# Patient Record
Sex: MALE | Race: WHITE | Employment: OTHER | URBAN - METROPOLITAN AREA
[De-identification: names, ages, dates, MRNs, and addresses within clinical notes are randomized per-mention and may not be internally consistent; named-entity substitution may affect disease eponyms.]

---

## 2017-08-16 ENCOUNTER — HOSPITAL ENCOUNTER (OUTPATIENT)
Dept: PREADMISSION TESTING | Age: 77
Discharge: HOME | End: 2017-08-16
Payer: MEDICARE

## 2017-08-16 VITALS
WEIGHT: 189.15 LBS | TEMPERATURE: 97.4 F | OXYGEN SATURATION: 96 % | SYSTOLIC BLOOD PRESSURE: 186 MMHG | RESPIRATION RATE: 17 BRPM | BODY MASS INDEX: 25.62 KG/M2 | DIASTOLIC BLOOD PRESSURE: 93 MMHG | HEIGHT: 72 IN

## 2017-08-16 LAB
ABO + RH BLD: NORMAL
ALBUMIN SERPL-MCNC: 4.2 G/DL (ref 3.5–5)
ALBUMIN/GLOB SERPL: 1 {RATIO} (ref 1.1–2.2)
ALP SERPL-CCNC: 74 U/L (ref 45–117)
ALT SERPL-CCNC: 20 U/L (ref 12–78)
ANION GAP SERPL CALC-SCNC: 6 MMOL/L (ref 5–15)
APPEARANCE UR: CLEAR
APTT PPP: 26.9 SEC (ref 22.1–32.5)
AST SERPL-CCNC: 24 U/L (ref 15–37)
BACTERIA URNS QL MICRO: NEGATIVE /HPF
BASOPHILS # BLD: 0.1 K/UL (ref 0–0.1)
BASOPHILS NFR BLD: 1 % (ref 0–1)
BILIRUB SERPL-MCNC: 0.6 MG/DL (ref 0.2–1)
BILIRUB UR QL: NEGATIVE
BLOOD GROUP ANTIBODIES SERPL: NORMAL
BUN SERPL-MCNC: 20 MG/DL (ref 6–20)
BUN/CREAT SERPL: 12 (ref 12–20)
CALCIUM SERPL-MCNC: 9.5 MG/DL (ref 8.5–10.1)
CHLORIDE SERPL-SCNC: 101 MMOL/L (ref 97–108)
CO2 SERPL-SCNC: 31 MMOL/L (ref 21–32)
COLOR UR: NORMAL
CREAT SERPL-MCNC: 1.67 MG/DL (ref 0.7–1.3)
EOSINOPHIL # BLD: 0.2 K/UL (ref 0–0.4)
EOSINOPHIL NFR BLD: 2 % (ref 0–7)
EPITH CASTS URNS QL MICRO: NORMAL /LPF
ERYTHROCYTE [DISTWIDTH] IN BLOOD BY AUTOMATED COUNT: 13.5 % (ref 11.5–14.5)
EST. AVERAGE GLUCOSE BLD GHB EST-MCNC: 134 MG/DL
GLOBULIN SER CALC-MCNC: 4.3 G/DL (ref 2–4)
GLUCOSE SERPL-MCNC: 95 MG/DL (ref 65–100)
GLUCOSE UR STRIP.AUTO-MCNC: NEGATIVE MG/DL
HBA1C MFR BLD: 6.3 % (ref 4.2–6.3)
HCT VFR BLD AUTO: 48.5 % (ref 36.6–50.3)
HGB BLD-MCNC: 16.3 G/DL (ref 12.1–17)
HGB UR QL STRIP: NEGATIVE
HYALINE CASTS URNS QL MICRO: NORMAL /LPF (ref 0–5)
INR PPP: 1 (ref 0.9–1.1)
KETONES UR QL STRIP.AUTO: NEGATIVE MG/DL
LEUKOCYTE ESTERASE UR QL STRIP.AUTO: NEGATIVE
LYMPHOCYTES # BLD: 1.5 K/UL (ref 0.8–3.5)
LYMPHOCYTES NFR BLD: 19 % (ref 12–49)
MCH RBC QN AUTO: 31.2 PG (ref 26–34)
MCHC RBC AUTO-ENTMCNC: 33.6 G/DL (ref 30–36.5)
MCV RBC AUTO: 92.7 FL (ref 80–99)
MONOCYTES # BLD: 1 K/UL (ref 0–1)
MONOCYTES NFR BLD: 12 % (ref 5–13)
NEUTS SEG # BLD: 5.4 K/UL (ref 1.8–8)
NEUTS SEG NFR BLD: 66 % (ref 32–75)
NITRITE UR QL STRIP.AUTO: NEGATIVE
PH UR STRIP: 5.5 [PH] (ref 5–8)
PLATELET # BLD AUTO: 243 K/UL (ref 150–400)
POTASSIUM SERPL-SCNC: 4.8 MMOL/L (ref 3.5–5.1)
PROT SERPL-MCNC: 8.5 G/DL (ref 6.4–8.2)
PROT UR STRIP-MCNC: NEGATIVE MG/DL
PROTHROMBIN TIME: 10.1 SEC (ref 9–11.1)
RBC # BLD AUTO: 5.23 M/UL (ref 4.1–5.7)
RBC #/AREA URNS HPF: NORMAL /HPF (ref 0–5)
SODIUM SERPL-SCNC: 138 MMOL/L (ref 136–145)
SP GR UR REFRACTOMETRY: 1.01 (ref 1–1.03)
SPECIMEN EXP DATE BLD: NORMAL
THERAPEUTIC RANGE,PTTT: NORMAL SECS (ref 58–77)
UA: UC IF INDICATED,UAUC: NORMAL
UROBILINOGEN UR QL STRIP.AUTO: 0.2 EU/DL (ref 0.2–1)
WBC # BLD AUTO: 8.1 K/UL (ref 4.1–11.1)
WBC URNS QL MICRO: NORMAL /HPF (ref 0–4)

## 2017-08-16 PROCEDURE — 85730 THROMBOPLASTIN TIME PARTIAL: CPT | Performed by: ORTHOPAEDIC SURGERY

## 2017-08-16 PROCEDURE — 36415 COLL VENOUS BLD VENIPUNCTURE: CPT | Performed by: ORTHOPAEDIC SURGERY

## 2017-08-16 PROCEDURE — 86900 BLOOD TYPING SEROLOGIC ABO: CPT | Performed by: ORTHOPAEDIC SURGERY

## 2017-08-16 PROCEDURE — 93005 ELECTROCARDIOGRAM TRACING: CPT

## 2017-08-16 PROCEDURE — 80053 COMPREHEN METABOLIC PANEL: CPT | Performed by: ORTHOPAEDIC SURGERY

## 2017-08-16 PROCEDURE — 81001 URINALYSIS AUTO W/SCOPE: CPT | Performed by: ORTHOPAEDIC SURGERY

## 2017-08-16 PROCEDURE — 85025 COMPLETE CBC W/AUTO DIFF WBC: CPT | Performed by: ORTHOPAEDIC SURGERY

## 2017-08-16 PROCEDURE — 85610 PROTHROMBIN TIME: CPT | Performed by: ORTHOPAEDIC SURGERY

## 2017-08-16 PROCEDURE — 83036 HEMOGLOBIN GLYCOSYLATED A1C: CPT | Performed by: ORTHOPAEDIC SURGERY

## 2017-08-16 RX ORDER — CHOLECALCIFEROL (VITAMIN D3) 125 MCG
1 CAPSULE ORAL
COMMUNITY
End: 2017-08-24

## 2017-08-16 RX ORDER — ASCORBIC ACID 500 MG
500 TABLET ORAL DAILY
COMMUNITY

## 2017-08-16 RX ORDER — BISOPROLOL FUMARATE AND HYDROCHLOROTHIAZIDE 10; 6.25 MG/1; MG/1
1 TABLET ORAL DAILY
COMMUNITY

## 2017-08-16 RX ORDER — VITAMIN E 268 MG
CAPSULE ORAL DAILY
COMMUNITY
End: 2017-08-24

## 2017-08-16 RX ORDER — FAMOTIDINE 20 MG/1
20 TABLET, FILM COATED ORAL 2 TIMES DAILY
COMMUNITY

## 2017-08-16 RX ORDER — FAMOTIDINE 20 MG/1
20 TABLET, FILM COATED ORAL 2 TIMES DAILY
COMMUNITY
End: 2017-08-16

## 2017-08-16 RX ORDER — BENAZEPRIL HYDROCHLORIDE 40 MG/1
40 TABLET ORAL DAILY
COMMUNITY

## 2017-08-16 RX ORDER — CILOSTAZOL 100 MG/1
100 TABLET ORAL 2 TIMES DAILY
COMMUNITY
End: 2017-08-24

## 2017-08-16 NOTE — PERIOP NOTES
Called pt's PCP Dr Paz Ours requested last chemiatry results to have comparison for today's creatinine of 1.67   Office to fax   States pt creatinine was 1.6 in May 2017

## 2017-08-16 NOTE — PERIOP NOTES
[facility]  PREOPERATIVE INSTRUCTIONS    Surgery Date:   8/21/2017  Surgery arrival time given by surgeon: NO   If [facility] staff will call you between 4 PM- 8 PM the day before surgery with your arrival time. If your surgery is on a Monday, we will call you the preceding Friday. Please call [phone] after 8 PM if you did not receive your arrival time. 1. Please report at the designated time to the [address] [place] [address] [facility]. Bring your insurance card, photo identification, and any copayment ( if applicable). 2. You must have a responsible adult to drive you home. You need to have a responsible adult to stay with you the first 24 hours after surgery if you are going home the same day of your surgery and you should not drive a car for 24 hours following your surgery. 3. Nothing to eat or drink after midnight the night before surgery. This includes no water, gum, mints, coffee, juice, etc.  Please note special instructions, if applicable, below for medications. 4. MEDICATIONS TO TAKE THE MORNING OF SURGERY WITH A SIP OF WATER: ______famotidine, bisoprolol/hydochlorothiazide________        Your prescription pain medicine may be taken with a sip of water the morning of surgery  5. No alcoholic beverages 24 hours before or after your surgery. 6. If you are being admitted to the hospital, please leave personal belongings/luggage in your car until you have an assigned hospital room number. 7. Stop Aspirin and/or any non-steroidal anti-inflammatory drugs (i.e. Ibuprofen, Naproxen, Advil, Aleve) as directed by your surgeon. You may take Tylenol. 8. Stop herbal supplements 1 week prior to surgery. 9. If you are currently taking Plavix, Coumadin,or any other blood-thinning/anticoagulant medication contact your surgeon for instructions. 10. Please wear comfortable clothes. Wear your glasses instead of contacts. We ask that all money, jewelry and valuables be left at home.  Wear no make-up, particularly mascara, the day of surgery. 11.  All body piercings, rings and jewelry need to be removed and left at home. Please wear your hair loose or down. Please no pony-tails, buns, or any metal hair accessories. If you shower the morning of surgery, please do not apply any lotions, powders, or deodorants afterwards. Do not shave any body area within 24 hours of your surgery. 12. Please follow all instructions to avoid any potential surgical cancellation. 13. Should your physical condition change, (i.e. fever, cold, flu, etc.) please notify your surgeon as soon as possible. 14. It is important to be on time. If a situation occurs where you may be delayed, please call:  [phone] / [phone] on the day of surgery. 15. The Preadmission Testing staff can be reached at 21 [phone]. Meir Arnold 12. Bring your completed Medication Reconciliation sheet with your the morning of surgery  Special instructions:   · Free  Parking between [address]  The patient was contacted  in person. He  verbalizes  understanding of all instructions   Medications reviewed and med reconciliation sheets for prescriptions given to patient for review & return day of surgery. Special Instructions:  · Use Chlorhexidine Care Fusion wash and sponges 3 days prior to surgery as instructed. · Incentive spirometer given with instructions to practice at home and bring back to the hospital on the day of surgery. · [facility] will contact you if your Hemoglobin A1C is greater than 7.5. · Ensure/Glucerna  sample, nutritional information, and Ensure/Glucerna coupon given. · Pain pamphlet and Call Don't Fall reminder reviewed with patient.   ·  parking is complimentary Monday - Friday 7 am - 5 pm  · Bring PTA Medication list day of surgery with the last doses taken documented

## 2017-08-16 NOTE — PERIOP NOTES
Faxed CMP results of today c creatinine of 1.67 to Dr Alondra Cerrato office along with past labs obtained from pt's PCP office that show elevated creatinine for past year

## 2017-08-16 NOTE — PERIOP NOTES
fax form for medication plan cilostazol to Dr Laura Wang (prescriber of cilostazol) for recommendations.     Pt stataed he stopped this med on 8/11/2017

## 2017-08-16 NOTE — PERIOP NOTES
Per Pt:Dr Sue Booth told him to stop his cilostazol, fish oil, aleve and all vitamin supplements 10 days before surgery    Pt also states he had his physical done today c Dr Hank Russell

## 2017-08-17 LAB
ATRIAL RATE: 68 BPM
BACTERIA SPEC CULT: NORMAL
BACTERIA SPEC CULT: NORMAL
CALCULATED P AXIS, ECG09: -23 DEGREES
CALCULATED R AXIS, ECG10: -22 DEGREES
CALCULATED T AXIS, ECG11: 2 DEGREES
DIAGNOSIS, 93000: NORMAL
P-R INTERVAL, ECG05: 166 MS
Q-T INTERVAL, ECG07: 402 MS
QRS DURATION, ECG06: 102 MS
QTC CALCULATION (BEZET), ECG08: 427 MS
SERVICE CMNT-IMP: NORMAL
VENTRICULAR RATE, ECG03: 68 BPM

## 2017-08-18 ENCOUNTER — ANESTHESIA EVENT (OUTPATIENT)
Dept: SURGERY | Age: 77
DRG: 460 | End: 2017-08-18
Payer: MEDICARE

## 2017-08-21 ENCOUNTER — ANESTHESIA (OUTPATIENT)
Dept: SURGERY | Age: 77
DRG: 460 | End: 2017-08-21
Payer: MEDICARE

## 2017-08-21 ENCOUNTER — HOSPITAL ENCOUNTER (INPATIENT)
Age: 77
LOS: 3 days | Discharge: HOME HEALTH | DRG: 460 | End: 2017-08-24
Attending: ORTHOPAEDIC SURGERY | Admitting: ORTHOPAEDIC SURGERY
Payer: MEDICARE

## 2017-08-21 ENCOUNTER — APPOINTMENT (OUTPATIENT)
Dept: GENERAL RADIOLOGY | Age: 77
DRG: 460 | End: 2017-08-21
Attending: ORTHOPAEDIC SURGERY
Payer: MEDICARE

## 2017-08-21 PROBLEM — N28.9 RENAL INSUFFICIENCY: Status: ACTIVE | Noted: 2017-08-21

## 2017-08-21 PROBLEM — I82.409 DVT (DEEP VENOUS THROMBOSIS) (HCC): Status: ACTIVE | Noted: 2017-08-21

## 2017-08-21 PROBLEM — M48.062 LUMBAR STENOSIS WITH NEUROGENIC CLAUDICATION: Status: ACTIVE | Noted: 2017-08-21

## 2017-08-21 PROBLEM — I10 HTN (HYPERTENSION): Status: ACTIVE | Noted: 2017-08-21

## 2017-08-21 PROCEDURE — 65270000029 HC RM PRIVATE

## 2017-08-21 PROCEDURE — 77030034274 HC GRFT BN CHIP ALLGRFT 10CC REGE -I: Performed by: ORTHOPAEDIC SURGERY

## 2017-08-21 PROCEDURE — 74011250636 HC RX REV CODE- 250/636: Performed by: ORTHOPAEDIC SURGERY

## 2017-08-21 PROCEDURE — 93971 EXTREMITY STUDY: CPT

## 2017-08-21 PROCEDURE — 77030029099 HC BN WAX SSPC -A: Performed by: ORTHOPAEDIC SURGERY

## 2017-08-21 PROCEDURE — 77030012406 HC DRN WND PENRS BARD -A: Performed by: ORTHOPAEDIC SURGERY

## 2017-08-21 PROCEDURE — 77030008684 HC TU ET CUF COVD -B: Performed by: NURSE ANESTHETIST, CERTIFIED REGISTERED

## 2017-08-21 PROCEDURE — 77030020782 HC GWN BAIR PAWS FLX 3M -B

## 2017-08-21 PROCEDURE — 77030003666 HC NDL SPINAL BD -A: Performed by: ORTHOPAEDIC SURGERY

## 2017-08-21 PROCEDURE — C1713 ANCHOR/SCREW BN/BN,TIS/BN: HCPCS | Performed by: ORTHOPAEDIC SURGERY

## 2017-08-21 PROCEDURE — 77030026188 HC BN CANC CHP CRSH PR LIFV -E: Performed by: ORTHOPAEDIC SURGERY

## 2017-08-21 PROCEDURE — 74011250636 HC RX REV CODE- 250/636: Performed by: ANESTHESIOLOGY

## 2017-08-21 PROCEDURE — 77030004391 HC BUR FLUT MEDT -C: Performed by: ORTHOPAEDIC SURGERY

## 2017-08-21 PROCEDURE — 76001 XR FLUOROSCOPY OVER 60 MINUTES: CPT

## 2017-08-21 PROCEDURE — 74011250636 HC RX REV CODE- 250/636

## 2017-08-21 PROCEDURE — 77030033067 HC SUT PDO STRATFX SPIR J&J -B: Performed by: ORTHOPAEDIC SURGERY

## 2017-08-21 PROCEDURE — 76010000175 HC OR TIME 4 TO 4.5 HR INTENSV-TIER 1: Performed by: ORTHOPAEDIC SURGERY

## 2017-08-21 PROCEDURE — 77030034850: Performed by: ORTHOPAEDIC SURGERY

## 2017-08-21 PROCEDURE — 77030037202 HC SPCR SPN BULL TIP PEK VBR IBF REGE -I1: Performed by: ORTHOPAEDIC SURGERY

## 2017-08-21 PROCEDURE — 77030034479 HC ADH SKN CLSR PRINEO J&J -B: Performed by: ORTHOPAEDIC SURGERY

## 2017-08-21 PROCEDURE — 74011000272 HC RX REV CODE- 272: Performed by: ORTHOPAEDIC SURGERY

## 2017-08-21 PROCEDURE — 77030018723 HC ELCTRD BLD COVD -A: Performed by: ORTHOPAEDIC SURGERY

## 2017-08-21 PROCEDURE — 77030003161 HC GRFT DURA MTRX INLC -E: Performed by: ORTHOPAEDIC SURGERY

## 2017-08-21 PROCEDURE — 74011000250 HC RX REV CODE- 250

## 2017-08-21 PROCEDURE — 76060000039 HC ANESTHESIA 4 TO 4.5 HR: Performed by: ORTHOPAEDIC SURGERY

## 2017-08-21 PROCEDURE — 74011250637 HC RX REV CODE- 250/637: Performed by: ORTHOPAEDIC SURGERY

## 2017-08-21 PROCEDURE — 74011250636 HC RX REV CODE- 250/636: Performed by: PHYSICIAN ASSISTANT

## 2017-08-21 PROCEDURE — 77030019908 HC STETH ESOPH SIMS -A: Performed by: NURSE ANESTHETIST, CERTIFIED REGISTERED

## 2017-08-21 PROCEDURE — 77030012890

## 2017-08-21 PROCEDURE — 74011000250 HC RX REV CODE- 250: Performed by: ORTHOPAEDIC SURGERY

## 2017-08-21 PROCEDURE — 77030013079 HC BLNKT BAIR HGGR 3M -A: Performed by: ANESTHESIOLOGY

## 2017-08-21 PROCEDURE — 77030026438 HC STYL ET INTUB CARD -A: Performed by: NURSE ANESTHETIST, CERTIFIED REGISTERED

## 2017-08-21 PROCEDURE — 77030018846 HC SOL IRR STRL H20 ICUM -A: Performed by: ORTHOPAEDIC SURGERY

## 2017-08-21 PROCEDURE — 77030018719 HC DRSG PTCH ANTIMIC J&J -A: Performed by: ORTHOPAEDIC SURGERY

## 2017-08-21 PROCEDURE — 77030030107 HC BLD BN MILL DISP STRY -C: Performed by: ORTHOPAEDIC SURGERY

## 2017-08-21 PROCEDURE — 77030031139 HC SUT VCRL2 J&J -A: Performed by: ORTHOPAEDIC SURGERY

## 2017-08-21 PROCEDURE — 0SG10A1 FUSION 2-4 L JT W INTBD FUS DEV, POST APPR P COL, OPEN: ICD-10-PCS | Performed by: ORTHOPAEDIC SURGERY

## 2017-08-21 PROCEDURE — 4A11X4G MONITORING OF PERIPHERAL NERVOUS ELECTRICAL ACTIVITY, INTRAOPERATIVE, EXTERNAL APPROACH: ICD-10-PCS | Performed by: ORTHOPAEDIC SURGERY

## 2017-08-21 PROCEDURE — 77030032490 HC SLV COMPR SCD KNE COVD -B: Performed by: ORTHOPAEDIC SURGERY

## 2017-08-21 PROCEDURE — 77030002933 HC SUT MCRYL J&J -A: Performed by: ORTHOPAEDIC SURGERY

## 2017-08-21 PROCEDURE — 76210000001 HC OR PH I REC 2.5 TO 3 HR: Performed by: ORTHOPAEDIC SURGERY

## 2017-08-21 DEVICE — SCR SET SPNE STREAMLINE TL --: Type: IMPLANTABLE DEVICE | Site: BACK | Status: FUNCTIONAL

## 2017-08-21 DEVICE — SPACER SPNL BULL 22X11X13 MM INTBDY THORLUM PEEK: Type: IMPLANTABLE DEVICE | Site: SPINE LUMBAR | Status: FUNCTIONAL

## 2017-08-21 DEVICE — SCR BNE SPNE 6.5X50MM TI -- STREAMLINE TL: Type: IMPLANTABLE DEVICE | Site: BACK | Status: FUNCTIONAL

## 2017-08-21 DEVICE — DURAGEN® SUTURABLE DURAL REGENERATION MATRIX, 2 IN X 2 IN (5 CM X 5 CM)
Type: IMPLANTABLE DEVICE | Site: SPINE LUMBAR | Status: FUNCTIONAL
Brand: DURAGEN® SUTURABLE

## 2017-08-21 DEVICE — GRAFT BNE 30CC 1 4MM CANC CRUSH CHIP READIGRFT: Type: IMPLANTABLE DEVICE | Site: SPINE LUMBAR | Status: FUNCTIONAL

## 2017-08-21 DEVICE — SCR BNE SPNE 6.5X45MM TI -- STREAMLINE TL: Type: IMPLANTABLE DEVICE | Site: BACK | Status: FUNCTIONAL

## 2017-08-21 DEVICE — GRAFT BNE SUB 10CC CORT CANC DBM CRYOGENICALLY PRESERVED: Type: IMPLANTABLE DEVICE | Site: SPINE LUMBAR | Status: FUNCTIONAL

## 2017-08-21 DEVICE — 5.5MM CURVED ROD 65MM TI ALLOY
Type: IMPLANTABLE DEVICE | Site: BACK | Status: FUNCTIONAL
Brand: TAURUS

## 2017-08-21 RX ORDER — ONDANSETRON 2 MG/ML
INJECTION INTRAMUSCULAR; INTRAVENOUS AS NEEDED
Status: DISCONTINUED | OUTPATIENT
Start: 2017-08-21 | End: 2017-08-21 | Stop reason: HOSPADM

## 2017-08-21 RX ORDER — FAMOTIDINE 20 MG/1
20 TABLET, FILM COATED ORAL 2 TIMES DAILY
Status: DISCONTINUED | OUTPATIENT
Start: 2017-08-21 | End: 2017-08-24 | Stop reason: HOSPADM

## 2017-08-21 RX ORDER — SODIUM CHLORIDE 9 MG/ML
50 INJECTION, SOLUTION INTRAVENOUS CONTINUOUS
Status: DISCONTINUED | OUTPATIENT
Start: 2017-08-21 | End: 2017-08-24 | Stop reason: HOSPADM

## 2017-08-21 RX ORDER — POVIDONE-IODINE 10 %
SOLUTION, NON-ORAL TOPICAL AS NEEDED
Status: DISCONTINUED | OUTPATIENT
Start: 2017-08-21 | End: 2017-08-21 | Stop reason: HOSPADM

## 2017-08-21 RX ORDER — HYDROMORPHONE HCL IN 0.9% NACL 15 MG/30ML
PATIENT CONTROLLED ANALGESIA VIAL INTRAVENOUS
Status: DISCONTINUED | OUTPATIENT
Start: 2017-08-21 | End: 2017-08-22

## 2017-08-21 RX ORDER — SODIUM CHLORIDE, SODIUM LACTATE, POTASSIUM CHLORIDE, CALCIUM CHLORIDE 600; 310; 30; 20 MG/100ML; MG/100ML; MG/100ML; MG/100ML
125 INJECTION, SOLUTION INTRAVENOUS CONTINUOUS
Status: DISCONTINUED | OUTPATIENT
Start: 2017-08-21 | End: 2017-08-21 | Stop reason: HOSPADM

## 2017-08-21 RX ORDER — DEXAMETHASONE SODIUM PHOSPHATE 4 MG/ML
INJECTION, SOLUTION INTRA-ARTICULAR; INTRALESIONAL; INTRAMUSCULAR; INTRAVENOUS; SOFT TISSUE AS NEEDED
Status: DISCONTINUED | OUTPATIENT
Start: 2017-08-21 | End: 2017-08-21 | Stop reason: HOSPADM

## 2017-08-21 RX ORDER — CEFAZOLIN SODIUM IN 0.9 % NACL 2 G/50 ML
2 INTRAVENOUS SOLUTION, PIGGYBACK (ML) INTRAVENOUS EVERY 8 HOURS
Status: COMPLETED | OUTPATIENT
Start: 2017-08-21 | End: 2017-08-23

## 2017-08-21 RX ORDER — ACETAMINOPHEN 325 MG/1
650 TABLET ORAL
Status: DISCONTINUED | OUTPATIENT
Start: 2017-08-21 | End: 2017-08-24 | Stop reason: HOSPADM

## 2017-08-21 RX ORDER — BISOPROLOL FUMARATE AND HYDROCHLOROTHIAZIDE 10; 6.25 MG/1; MG/1
1 TABLET ORAL DAILY
Status: DISCONTINUED | OUTPATIENT
Start: 2017-08-22 | End: 2017-08-24 | Stop reason: HOSPADM

## 2017-08-21 RX ORDER — HYDRALAZINE HYDROCHLORIDE 20 MG/ML
10 INJECTION INTRAMUSCULAR; INTRAVENOUS
Status: DISCONTINUED | OUTPATIENT
Start: 2017-08-21 | End: 2017-08-24 | Stop reason: HOSPADM

## 2017-08-21 RX ORDER — ONDANSETRON 2 MG/ML
4 INJECTION INTRAMUSCULAR; INTRAVENOUS AS NEEDED
Status: DISCONTINUED | OUTPATIENT
Start: 2017-08-21 | End: 2017-08-21 | Stop reason: HOSPADM

## 2017-08-21 RX ORDER — NALOXONE HYDROCHLORIDE 0.4 MG/ML
0.4 INJECTION, SOLUTION INTRAMUSCULAR; INTRAVENOUS; SUBCUTANEOUS AS NEEDED
Status: DISCONTINUED | OUTPATIENT
Start: 2017-08-21 | End: 2017-08-24 | Stop reason: HOSPADM

## 2017-08-21 RX ORDER — DOCUSATE SODIUM 100 MG/1
100 CAPSULE, LIQUID FILLED ORAL 2 TIMES DAILY
Status: DISCONTINUED | OUTPATIENT
Start: 2017-08-22 | End: 2017-08-24 | Stop reason: HOSPADM

## 2017-08-21 RX ORDER — SODIUM CHLORIDE, SODIUM LACTATE, POTASSIUM CHLORIDE, CALCIUM CHLORIDE 600; 310; 30; 20 MG/100ML; MG/100ML; MG/100ML; MG/100ML
100 INJECTION, SOLUTION INTRAVENOUS CONTINUOUS
Status: DISCONTINUED | OUTPATIENT
Start: 2017-08-21 | End: 2017-08-21 | Stop reason: HOSPADM

## 2017-08-21 RX ORDER — FENTANYL CITRATE 50 UG/ML
INJECTION, SOLUTION INTRAMUSCULAR; INTRAVENOUS AS NEEDED
Status: DISCONTINUED | OUTPATIENT
Start: 2017-08-21 | End: 2017-08-21 | Stop reason: HOSPADM

## 2017-08-21 RX ORDER — HYDROMORPHONE HYDROCHLORIDE 2 MG/ML
INJECTION, SOLUTION INTRAMUSCULAR; INTRAVENOUS; SUBCUTANEOUS AS NEEDED
Status: DISCONTINUED | OUTPATIENT
Start: 2017-08-21 | End: 2017-08-21 | Stop reason: HOSPADM

## 2017-08-21 RX ORDER — OXYCODONE AND ACETAMINOPHEN 10; 325 MG/1; MG/1
1 TABLET ORAL
Status: DISCONTINUED | OUTPATIENT
Start: 2017-08-21 | End: 2017-08-22

## 2017-08-21 RX ORDER — SODIUM CHLORIDE 0.9 % (FLUSH) 0.9 %
5-10 SYRINGE (ML) INJECTION AS NEEDED
Status: DISCONTINUED | OUTPATIENT
Start: 2017-08-21 | End: 2017-08-21 | Stop reason: HOSPADM

## 2017-08-21 RX ORDER — HYDROMORPHONE HYDROCHLORIDE 1 MG/ML
.25-1 INJECTION, SOLUTION INTRAMUSCULAR; INTRAVENOUS; SUBCUTANEOUS
Status: DISCONTINUED | OUTPATIENT
Start: 2017-08-21 | End: 2017-08-21 | Stop reason: HOSPADM

## 2017-08-21 RX ORDER — SODIUM CHLORIDE 0.9 % (FLUSH) 0.9 %
5-10 SYRINGE (ML) INJECTION AS NEEDED
Status: DISCONTINUED | OUTPATIENT
Start: 2017-08-21 | End: 2017-08-24 | Stop reason: HOSPADM

## 2017-08-21 RX ORDER — LIDOCAINE HYDROCHLORIDE 20 MG/ML
INJECTION, SOLUTION EPIDURAL; INFILTRATION; INTRACAUDAL; PERINEURAL AS NEEDED
Status: DISCONTINUED | OUTPATIENT
Start: 2017-08-21 | End: 2017-08-21 | Stop reason: HOSPADM

## 2017-08-21 RX ORDER — SODIUM CHLORIDE 0.9 % (FLUSH) 0.9 %
5-10 SYRINGE (ML) INJECTION EVERY 8 HOURS
Status: DISCONTINUED | OUTPATIENT
Start: 2017-08-21 | End: 2017-08-24 | Stop reason: HOSPADM

## 2017-08-21 RX ORDER — PROPOFOL 10 MG/ML
INJECTION, EMULSION INTRAVENOUS AS NEEDED
Status: DISCONTINUED | OUTPATIENT
Start: 2017-08-21 | End: 2017-08-21 | Stop reason: HOSPADM

## 2017-08-21 RX ORDER — BENAZEPRIL HYDROCHLORIDE 10 MG/1
40 TABLET ORAL DAILY
Status: DISCONTINUED | OUTPATIENT
Start: 2017-08-21 | End: 2017-08-24 | Stop reason: HOSPADM

## 2017-08-21 RX ORDER — SODIUM CHLORIDE 0.9 % (FLUSH) 0.9 %
5-10 SYRINGE (ML) INJECTION EVERY 8 HOURS
Status: DISCONTINUED | OUTPATIENT
Start: 2017-08-21 | End: 2017-08-21 | Stop reason: HOSPADM

## 2017-08-21 RX ORDER — DEXAMETHASONE SODIUM PHOSPHATE 10 MG/ML
6 INJECTION INTRAMUSCULAR; INTRAVENOUS
Status: COMPLETED | OUTPATIENT
Start: 2017-08-21 | End: 2017-08-21

## 2017-08-21 RX ORDER — DIPHENHYDRAMINE HYDROCHLORIDE 50 MG/ML
12.5 INJECTION, SOLUTION INTRAMUSCULAR; INTRAVENOUS AS NEEDED
Status: DISCONTINUED | OUTPATIENT
Start: 2017-08-21 | End: 2017-08-21 | Stop reason: HOSPADM

## 2017-08-21 RX ORDER — SUCCINYLCHOLINE CHLORIDE 20 MG/ML
INJECTION INTRAMUSCULAR; INTRAVENOUS AS NEEDED
Status: DISCONTINUED | OUTPATIENT
Start: 2017-08-21 | End: 2017-08-21 | Stop reason: HOSPADM

## 2017-08-21 RX ORDER — ROCURONIUM BROMIDE 10 MG/ML
INJECTION, SOLUTION INTRAVENOUS AS NEEDED
Status: DISCONTINUED | OUTPATIENT
Start: 2017-08-21 | End: 2017-08-21 | Stop reason: HOSPADM

## 2017-08-21 RX ORDER — DIAZEPAM 5 MG/1
5 TABLET ORAL
Status: DISCONTINUED | OUTPATIENT
Start: 2017-08-21 | End: 2017-08-24 | Stop reason: HOSPADM

## 2017-08-21 RX ORDER — DIPHENHYDRAMINE HCL 25 MG
25 CAPSULE ORAL
Status: DISCONTINUED | OUTPATIENT
Start: 2017-08-21 | End: 2017-08-24 | Stop reason: HOSPADM

## 2017-08-21 RX ORDER — LIDOCAINE HYDROCHLORIDE 10 MG/ML
0.1 INJECTION, SOLUTION EPIDURAL; INFILTRATION; INTRACAUDAL; PERINEURAL AS NEEDED
Status: DISCONTINUED | OUTPATIENT
Start: 2017-08-21 | End: 2017-08-21 | Stop reason: HOSPADM

## 2017-08-21 RX ORDER — MIDAZOLAM HYDROCHLORIDE 1 MG/ML
INJECTION, SOLUTION INTRAMUSCULAR; INTRAVENOUS AS NEEDED
Status: DISCONTINUED | OUTPATIENT
Start: 2017-08-21 | End: 2017-08-21 | Stop reason: HOSPADM

## 2017-08-21 RX ORDER — HYDROMORPHONE HYDROCHLORIDE 1 MG/ML
1 INJECTION, SOLUTION INTRAMUSCULAR; INTRAVENOUS; SUBCUTANEOUS
Status: DISCONTINUED | OUTPATIENT
Start: 2017-08-21 | End: 2017-08-22

## 2017-08-21 RX ORDER — MELATONIN
1000 DAILY
Status: DISCONTINUED | OUTPATIENT
Start: 2017-08-22 | End: 2017-08-24 | Stop reason: HOSPADM

## 2017-08-21 RX ORDER — ONDANSETRON 2 MG/ML
4 INJECTION INTRAMUSCULAR; INTRAVENOUS
Status: DISCONTINUED | OUTPATIENT
Start: 2017-08-21 | End: 2017-08-24 | Stop reason: HOSPADM

## 2017-08-21 RX ADMIN — PROPOFOL 30 MG: 10 INJECTION, EMULSION INTRAVENOUS at 14:18

## 2017-08-21 RX ADMIN — ROCURONIUM BROMIDE 20 MG: 10 INJECTION, SOLUTION INTRAVENOUS at 15:02

## 2017-08-21 RX ADMIN — FENTANYL CITRATE 75 MCG: 50 INJECTION, SOLUTION INTRAMUSCULAR; INTRAVENOUS at 14:15

## 2017-08-21 RX ADMIN — ONDANSETRON 4 MG: 2 INJECTION INTRAMUSCULAR; INTRAVENOUS at 17:17

## 2017-08-21 RX ADMIN — BENAZEPRIL HYDROCHLORIDE 40 MG: 10 TABLET, COATED ORAL at 22:53

## 2017-08-21 RX ADMIN — LIDOCAINE HYDROCHLORIDE 60 MG: 20 INJECTION, SOLUTION EPIDURAL; INFILTRATION; INTRACAUDAL; PERINEURAL at 14:15

## 2017-08-21 RX ADMIN — CEFAZOLIN 0.2 G: 1 INJECTION, POWDER, FOR SOLUTION INTRAMUSCULAR; INTRAVENOUS; PARENTERAL at 13:38

## 2017-08-21 RX ADMIN — HYDROMORPHONE HYDROCHLORIDE 1 MG: 1 INJECTION, SOLUTION INTRAMUSCULAR; INTRAVENOUS; SUBCUTANEOUS at 20:12

## 2017-08-21 RX ADMIN — FENTANYL CITRATE 50 MCG: 50 INJECTION, SOLUTION INTRAMUSCULAR; INTRAVENOUS at 14:18

## 2017-08-21 RX ADMIN — DEXAMETHASONE SODIUM PHOSPHATE 4 MG: 4 INJECTION, SOLUTION INTRA-ARTICULAR; INTRALESIONAL; INTRAMUSCULAR; INTRAVENOUS; SOFT TISSUE at 14:44

## 2017-08-21 RX ADMIN — HYDROMORPHONE HYDROCHLORIDE 0.5 MG: 2 INJECTION, SOLUTION INTRAMUSCULAR; INTRAVENOUS; SUBCUTANEOUS at 17:22

## 2017-08-21 RX ADMIN — ROCURONIUM BROMIDE 5 MG: 10 INJECTION, SOLUTION INTRAVENOUS at 14:15

## 2017-08-21 RX ADMIN — PROPOFOL 130 MG: 10 INJECTION, EMULSION INTRAVENOUS at 14:15

## 2017-08-21 RX ADMIN — FAMOTIDINE 20 MG: 20 TABLET, FILM COATED ORAL at 22:53

## 2017-08-21 RX ADMIN — CEFAZOLIN 2 G: 1 INJECTION, POWDER, FOR SOLUTION INTRAMUSCULAR; INTRAVENOUS; PARENTERAL at 22:53

## 2017-08-21 RX ADMIN — SODIUM CHLORIDE, SODIUM LACTATE, POTASSIUM CHLORIDE, AND CALCIUM CHLORIDE: 600; 310; 30; 20 INJECTION, SOLUTION INTRAVENOUS at 18:10

## 2017-08-21 RX ADMIN — FENTANYL CITRATE 50 MCG: 50 INJECTION, SOLUTION INTRAMUSCULAR; INTRAVENOUS at 15:50

## 2017-08-21 RX ADMIN — DEXAMETHASONE SODIUM PHOSPHATE 6 MG: 10 INJECTION, SOLUTION INTRAMUSCULAR; INTRAVENOUS at 22:54

## 2017-08-21 RX ADMIN — HYDROMORPHONE HYDROCHLORIDE 1 MG: 1 INJECTION, SOLUTION INTRAMUSCULAR; INTRAVENOUS; SUBCUTANEOUS at 18:58

## 2017-08-21 RX ADMIN — SODIUM CHLORIDE, SODIUM LACTATE, POTASSIUM CHLORIDE, AND CALCIUM CHLORIDE 100 ML/HR: 600; 310; 30; 20 INJECTION, SOLUTION INTRAVENOUS at 13:20

## 2017-08-21 RX ADMIN — HYDROMORPHONE HYDROCHLORIDE 0.5 MG: 2 INJECTION, SOLUTION INTRAMUSCULAR; INTRAVENOUS; SUBCUTANEOUS at 17:19

## 2017-08-21 RX ADMIN — Medication 10 ML: at 22:54

## 2017-08-21 RX ADMIN — MIDAZOLAM HYDROCHLORIDE 2 MG: 1 INJECTION, SOLUTION INTRAMUSCULAR; INTRAVENOUS at 14:06

## 2017-08-21 RX ADMIN — FENTANYL CITRATE 25 MCG: 50 INJECTION, SOLUTION INTRAMUSCULAR; INTRAVENOUS at 14:06

## 2017-08-21 RX ADMIN — SODIUM CHLORIDE 125 ML/HR: 900 INJECTION, SOLUTION INTRAVENOUS at 19:15

## 2017-08-21 RX ADMIN — SODIUM CHLORIDE, SODIUM LACTATE, POTASSIUM CHLORIDE, AND CALCIUM CHLORIDE: 600; 310; 30; 20 INJECTION, SOLUTION INTRAVENOUS at 14:45

## 2017-08-21 RX ADMIN — FENTANYL CITRATE 50 MCG: 50 INJECTION, SOLUTION INTRAMUSCULAR; INTRAVENOUS at 16:37

## 2017-08-21 RX ADMIN — Medication: at 18:58

## 2017-08-21 RX ADMIN — SUCCINYLCHOLINE CHLORIDE 100 MG: 20 INJECTION INTRAMUSCULAR; INTRAVENOUS at 14:15

## 2017-08-21 NOTE — IP AVS SNAPSHOT
[address] 
[phone] Patient: Candace Portillo MRN: [id number] :1940 You are allergic to the following Allergen Reactions Peanut Angioedema Penicillins Hives Bad hives after penicillin injection, unsure if he has ever had a cephalosporin Recent Documentation Height Weight BMI Smoking Status 1.829 m 84.5 kg 25.27 kg/m2 Former Smoker Emergency Contacts Name Discharge Info Relation Home Work Mobile [facility] 144 CAREGIVER [3] Spouse [3] [phone] About your hospitalization You were admitted on:  2017 You last received care in the:  [facility] 4M POST SURG ORT 1 You were discharged on:  2017 Unit phone number:  [phone] Why you were hospitalized Your primary diagnosis was:  Not on File Your diagnoses also included:  Lumbar Stenosis With Neurogenic Claudication, Dvt (Deep Venous Thrombosis) (Hcc), Htn (Hypertension), Renal Insufficiency Providers Seen During Your Hospitalizations Provider Role Specialty Primary office phone Prince Grant MD Attending Provider Orthopedic Surgery [phone] Your Primary Care Physician (PCP) Primary Care Physician Office Phone Office Fax Serge Mooredick [phone] Follow-up Information Follow up With Details Comments Contact Info  [address] [address] [phone] Stormy Garcia MD In 1 week for re-evaluation and to schedule a repeat Left leg US within 1 month [address] [facility] [address] [phone] Your Vascular Surgeon, Dr. Jeanine Field an appointment as soon as possible for a visit RAQUEL Maldonado In 3 weeks as previously scheduled [address] [facility] [address] [facility] 
[phone] Current Discharge Medication List  
  
START taking these medications Dose & Instructions Dispensing Information Comments Morning Noon Evening Bedtime  
 cyclobenzaprine 5 mg tablet Commonly known as:  FLEXERIL Your last dose was: Your next dose is:    
   
   
 Dose:  5 mg Take 1 Tab by mouth three (3) times daily as needed for Muscle Spasm(s). Quantity:  60 Tab Refills:  1  
     
   
   
   
  
 docusate sodium 100 mg capsule Commonly known as:  Varsha Second Your last dose was: Your next dose is:    
   
   
 Dose:  100 mg Take 1 Cap by mouth two (2) times a day for 90 days. Quantity:  60 Cap Refills:  2  
     
   
   
   
  
 oxyCODONE-acetaminophen 5-325 mg per tablet Commonly known as:  PERCOCET Your last dose was: Your next dose is:    
   
   
 Dose:  1-2 Tab Take 1-2 Tabs by mouth every four (4) hours as needed for Pain. Max Daily Amount: 12 Tabs. Quantity:  1 Tab Refills:  0  
     
   
   
   
  
 promethazine 25 mg tablet Commonly known as:  PHENERGAN Your last dose was: Your next dose is:    
   
   
 Dose:  25 mg Take 1 Tab by mouth every six (6) hours as needed for Nausea. Quantity:  1 Tab Refills:  0 CONTINUE these medications which have CHANGED Dose & Instructions Dispensing Information Comments Morning Noon Evening Bedtime * OTHER What changed:  Another medication with the same name was removed. Continue taking this medication, and follow the directions you see here. Your last dose was: Your next dose is:    
   
   
 Dose:  1 Cap Take 1 Cap by mouth daily. Vitamin D 3 1000 mg Refills:  0  
     
   
   
   
  
 * OTHER What changed:  Another medication with the same name was removed. Continue taking this medication, and follow the directions you see here. Your last dose was:     
   
Your next dose is:    
   
   
 Dose: 2 Cap Take 2 Caps by mouth as needed. Lactase dairy digestive Refills:  0  
     
   
   
   
  
 * Notice: This list has 2 medication(s) that are the same as other medications prescribed for you. Read the directions carefully, and ask your doctor or other care provider to review them with you. CONTINUE these medications which have NOT CHANGED Dose & Instructions Dispensing Information Comments Morning Noon Evening Bedtime  
 benazepril 40 mg tablet Commonly known as:  LOTENSIN Your last dose was: Your next dose is:    
   
   
 Dose:  40 mg Take 40 mg by mouth daily. Refills:  0  
     
   
   
   
  
 bisoprolol-hydroCHLOROthiazide 10-6.25 mg per tablet Commonly known as:  [facility] Your last dose was: Your next dose is:    
   
   
 Dose:  1 Tab Take 1 Tab by mouth daily. Refills:  0 CENTRUM SILVER PO Your last dose was: Your next dose is:    
   
   
 Dose:  1 Tab Take 1 Tab by mouth daily. Refills:  0 PEPCID 20 mg tablet Generic drug:  famotidine Your last dose was: Your next dose is:    
   
   
 Dose:  20 mg Take 20 mg by mouth two (2) times a day. Takes with breakfast & dinner Refills:  0  
     
   
   
   
  
 VITAMIN C 500 mg tablet Generic drug:  ascorbic acid (vitamin C) Your last dose was: Your next dose is:    
   
   
 Dose:  500 mg Take 500 mg by mouth daily. Refills:  0 STOP taking these medications ALEVE 220 mg Cap Generic drug:  naproxen sodium  
   
  
 cilostazol 100 mg tablet Commonly known as:  PLETAL  
   
  
 vitamin E 400 unit capsule Commonly known as:  [address] Where to Get Your Medications Information on where to get these meds will be given to you by the nurse or doctor. ! Ask your nurse or doctor about these medications  
  cyclobenzaprine 5 mg tablet docusate sodium 100 mg capsule  
 oxyCODONE-acetaminophen 5-325 mg per tablet  
 promethazine 25 mg tablet Discharge Instructions Lumbar Spinal Surgery Discharge Instructions Dr. Nidia Hernandez [address] [place] [address] [phone] Activity:   
 You are going home a well person, be as active as possible. Your only exercise should be walking. Start with short frequent walks and increase your walking distance each day. Start with walking twice a day for 5 minutes and increase your distance each day 2-3 minutes until you reach 25 minutes twice a day. Limit the amount of time you sit to 20-30 minute intervals. Sitting for prolonged periods of time will be uncomfortable for you following your surgery.  No bending, lifting (of 5lbs or more), twisting, or straining.  Do not drive until your doctor states you may do so. However, you may ride in a car for short distances.  If you are required to wear a brace, you should wear it at all times when you are out of bed. You may remove it when sleeping unless your physician advises you against it.  When you are in the bed, you may lay on your back or on either side. Do not lie on your stomach.  You may resume sexual relations 6 weeks after surgery.  Continue to use your incentive spirometer for deep breathing exercises. Driving: 
 You may not drive or return to work until instructed by your physician. However, you may ride in the car for short periods of time. Brace:  If you have a back brace, you should wear your brace at all times when you are out of bed. Do not wear the brace while in bed or showering.  Remember to always wear a cotton t-shirt underneath your brace.  Do not bend or twist when your brace is off. Diet: 
 You may resume your regular home diet as tolerated. If your throat is sore, you should eat soft foods for the first couple of days.  
 Be sure to drink plenty of fluids; it is important to keep yourself hydrated.  Avoid alcoholic beverages and ABSOLUTELY NO tobacco products. Tobacco products will interfere with your healing. If you continue to use tobacco, you may end up needing another surgery in the future. Dressing: You have on a Prineo dressing. This is a waterproof bacteriostatic dressing that requires no post-operative care. Please do not peel the dressing off, or apply any oil based products, as they may expedite the deterioration of the mesh. The dressing will slowly chip off on its own.  Do not rub or apply lotion or ointments to incision site. Shower: 
 You may shower 5 days after your surgery.  You may remove your brace during showers.  NO not use tub baths, swimming pools or Jacuzzis. Medications: 
 Check with your physician before taking any anti-inflammatory medications. (Advil, Aleve, Ibuprofen, Aspirin)  Take your pain medication as directed  Do NOT take additional Tylenol if your prescribed pain medication has acetaminophen in it (Endocet/Percocet, Lortab, Norco)  It is important to have regular bowel movements. Pain medications can be constipating. Stool softeners, warm prune juice, increasing your water intake, and increasing fiber in your diet can help in preventing constipation.  Do NOT take laxatives if at all possible except in severe situations. It can result in a vicious cycle of constipation and diarrhea. Stockings: 
 You have been given T.E.D. stockings to wear. Continue to wear these for 7 days after your discharge. Put them on in the morning and take them off at night. They are used to increase your circulation and prevent blood clots from forming in your legs. Follow-Up  Please call ASAP to schedule your 1st post-operative appointment. This should be approximately 3 weeks from your surgery date. Notify your physician in you develop any of the following conditions: 
 Fever above 101 degrees for 24 hours.  
 Nausea or vomiting.  Severe headache.  Inability to urinate  Loss of bowel or bladder function (sudden onset of incontinence)  Changes in sensation in your extremities (numbness, tingling, loss of color).  Severe pain or pain not relieved by medications.  Redness, swelling, or drainage from your incision.  Persistent pain in the chest.  
 Pain in the calf of either leg.  Increased weakness (if this is greater than before your surgery). If you have any questions about your dressing contact your Orthopaedic Surgeons office. YOU CAN RE-START TAKING YOU PLETAL, VITAMIN E AND FISH OIL WHEN YOU ARE 1 WEEK OUT FROM SURGERY    
 
** IMPORTANT: UNDER YOUR HONEYCOMB DRESSING, YOU HAVE A PRINEO ADHESIVE MESH DIRECTLY OVER YOUR INCISION. THIS WAS STERILELY GLUED TO YOUR SKIN DURING SURGERY. DO NOT REMOVE THIS. NO ONE ELSE SHOULD REMOVE IT EITHER. IT WILL COME OFF ON ITS OWN OVER TIME 
 
* WEAR YOUR BRACE AS ADVISED * NO DRIVING UNTIL YOU ARE CLEARED TO DO SO BY YOUR SURGEON 
 
* LIMIT LIFTING, BENDING AND TWISTING. NO LIFTING MORE THAN 5 LBS * MAKE SURE YOU ARE GETTING GOOD NUTRITION (Lean Protein, Vitamin D AND Calcium) * DO NOT TAKE ANY NSAIDs FOR THE FIRST 3 MONTHS AFTER SURGERY (such as Advil/Ibuprofen/Motrin, Aleve/Naproxen/Naprosyn, Diclofenac, Celebrex, Meloxicam, Indomethacin, Goody's powder, BC powder etc.) * NO NICOTINE PRODUCTS * FULLY READ YOUR DISCHARGE INSTRUCTIONS Discharge Orders [facility] Announce[facility] We are excited to announce that we are making your provider's discharge notes available to you in [employer]. You will see these notes when they are completed and signed by the physician that discharged you from your recent hospital stay.   If you have any questions or concerns about any information you see in [employer], please call the Health Information Department where you were seen or reach out to your Primary Care Provider for more information about your plan of care. Introducing [address]! [facility] introduces [employer] patient portal. Now you can access parts of your medical record, email your doctor's office, and request medication refills online. 1. In your internet browser, go to [email or web address] 2. Click on the First Time User? Click Here link in the Sign In box. You will see the New Member Sign Up page. 3. Enter your [employer] Access Code exactly as it appears below. You will not need to use this code after youve completed the sign-up process. If you do not sign up before the expiration date, you must request a new code. · [employer] Access Code: [email or web address] Expires: 11/14/2017 10:01 AM 
 
4. Enter the last four digits of your Social Security Number (xxxx) and Date of Birth (mm/dd/yyyy) as indicated and click Submit. You will be taken to the next sign-up page. 5. Create a [employer] ID. This will be your [employer] login ID and cannot be changed, so think of one that is secure and easy to remember. 6. Create a [employer] password. You can change your password at any time. 7. Enter your Password Reset Question and Answer. This can be used at a later time if you forget your password. 8. Enter your e-mail address. You will receive e-mail notification when new information is available in [address] 9. Click Sign Up. You can now view and download portions of your medical record. 10. Click the Download Summary menu link to download a portable copy of your medical information. If you have questions, please visit the Frequently Asked Questions section of the [employer] website. Remember, [employer] is NOT to be used for urgent needs. For medical emergencies, dial 911. Now available from your iPhone and Android! General Information Please provide this summary of care documentation to your next provider. Patient Signature:  ____________________________________________________________ Date:  ____________________________________________________________  
  
[facility] [place] [facility] Provider Signature:  ____________________________________________________________ Date:  ____________________________________________________________

## 2017-08-21 NOTE — H&P
Date of Surgery Update:  Raquel Christopher was seen and examined. History and physical has been reviewed. The patient has been examined.  There have been no significant clinical changes since the completion of the originally dated History and Physical.    Signed By: Nidia Hernandez MD     August 21, 2017 1:28 PM

## 2017-08-21 NOTE — PROGRESS NOTES
Ancef graded challenge started, pre vital signs taken and charted. No signs/symptoms of reaction, continuing to monitor.

## 2017-08-21 NOTE — OP NOTES
[address] [facility]  [address] [facility], [address] [facility]    OPERATIVE REPORT      NAME: Shavonne Lora    AGE: 78 y.o. YOB: 1940    MEDICAL RECORD NUMBER: [id number]    DATE OF SURGERY: 8/21/2017    PREOPERATIVE DIAGNOSES:  1. Lumbar stenosis  2. Acquired lumbar spondylolisthesis    POSTOPERATIVE DIAGNOSES:  1. Lumbar stenosis   2. Acquired lumbar spondylolisthesis     OPERATIVE PROCEDURES:   1. Laminectomy, partial facetectomy, and foraminotomy of L3, L4, L5.  2. Posterolateral fusion and posterior lumbar interbody fusion of L3-L4. 3. Posterolateral fusion and posterior lumbar interbody fusion of L4-L5. 6. Pedicle screw instrumentation with [employer] pedicle screws for L3, L4, and L5 bilaterally. 7. Application of biomechanical interbody device at L4-L5 and L3-L4. 8. Morselized allograft for spine surgery and local autograft for spine surgery stem cells. SURGEON: Davion Vasquez MD     ASSISTANT: RAQUEL Irvin    ANESTHESIA: General    COMPLICATIONS: None apparent     NEUROMONITORING: We used SSEPs and spontaneous EMGs. We also stimulated the pedicle screws. ESTIMATED BLOOD LOSS: 200    INDICATION FOR PROCEDURE: The patient is a very pleasant 78 y.o. male with debilitating back pain and leg pain. He failed conservative measures. He elected to proceed with operative intervention. He was aware of the risks, benefits, and alternatives. He provided informed consent. PROCEDURE: The patient was identified in the preoperative holding area. His lumbar spine was marked by me. He was transferred to the operating room where general anesthesia was given. He was also given perioperative antibiotics. He was placed prone on the [facility] table. All bony prominences were well padded. The lumbar spine was prepped and draped in the usual standard fashion. We performed a surgical timeout. I made a skin incision in the midline.  I exposed the posterior lumbar spine. I took intraoperative fluoroscopy to verify our levels. I exposed the transverse processes of L3, L4, and L5 bilaterally. I then began my decompression by performing a laminectomy of L3, L4, and L5. I also performed a bilateral partial facetectomy and foraminotomy to decompress the thecal sac and the roots of L3, L4, and L5. I performed a transforaminal approach to L4-L5 on the left side by decompressing the stenosis found within the foramen and lateral to the foramen on the left side for L4-L5. I performed an identical transforaminal approach to L3-L4 on the left side. I decompressed the stenosis found within the foramen and lateral to the foramen on the left side for L3-L4. I then performed a standard diskectomy at L4-L5 and L3-L4. I prepared the endplates to bleeding bone. I performed trial sizing. I placed the appropriately sized biomechanical device into L4-L5 and into L3-L4 with the appropriate amount of tension and alignment. The biomechanical devices were packed with local autograft. I then cannulated our pedicles for L3, L4, and L5 bilaterally in standard fashion. I probed each pedicle. I copiously irrigated the entire wound. I decorticated our fusion beds bilaterally with a high speed bur. I placed our bone graft with cells into our fusion beds. I then placed pedicle screws for L3, L4, and L5 bilaterally in standard fashion under fluoroscopic guidance. I had good purchase for each pedicle screw. I stimulated all the screws with pedicle screw stimulation. The pedicle screws were found to be within the appropriate range of amplitude. I then placed contoured rods on top of the pedicle screws bilaterally. The rods were locked to the screws according to the 's specification. We had good hemostasis. There was no CSF leaking. The fusion beds were packed with morselized allograft and local autograft. The exposed neurologic elements were protected with Duragen.  A deep drain was placed. The wound was closed in several layers. A sterile dressing was applied. The patient was extubated and transferred to the recovery room in good medical condition. IDr. Bora, performed the above procedure.      Bora Raymundo MD  8/21/2017

## 2017-08-21 NOTE — IP AVS SNAPSHOT
Sabrina Rodriguez 
 
 
 [address] [facility] [address] [facility] 
[phone] Patient: Nagi Ambrosio MRN: [id number] :1940 Current Discharge Medication List  
  
START taking these medications Dose & Instructions Dispensing Information Comments Morning Noon Evening Bedtime  
 cyclobenzaprine 5 mg tablet Commonly known as:  FLEXERIL Your last dose was: Your next dose is:    
   
   
 Dose:  5 mg Take 1 Tab by mouth three (3) times daily as needed for Muscle Spasm(s). Quantity:  60 Tab Refills:  1  
     
   
   
   
  
 docusate sodium 100 mg capsule Commonly known as:  Grey Guppy Your last dose was: Your next dose is:    
   
   
 Dose:  100 mg Take 1 Cap by mouth two (2) times a day for 90 days. Quantity:  60 Cap Refills:  2  
     
   
   
   
  
 oxyCODONE-acetaminophen 5-325 mg per tablet Commonly known as:  PERCOCET Your last dose was: Your next dose is:    
   
   
 Dose:  1-2 Tab Take 1-2 Tabs by mouth every four (4) hours as needed for Pain. Max Daily Amount: 12 Tabs. Quantity:  1 Tab Refills:  0  
     
   
   
   
  
 promethazine 25 mg tablet Commonly known as:  PHENERGAN Your last dose was: Your next dose is:    
   
   
 Dose:  25 mg Take 1 Tab by mouth every six (6) hours as needed for Nausea. Quantity:  1 Tab Refills:  0 CONTINUE these medications which have CHANGED Dose & Instructions Dispensing Information Comments Morning Noon Evening Bedtime * OTHER What changed:  Another medication with the same name was removed. Continue taking this medication, and follow the directions you see here. Your last dose was: Your next dose is:    
   
   
 Dose:  1 Cap Take 1 Cap by mouth daily. Vitamin D 3 1000 mg Refills:  0  
     
   
   
   
  
 * OTHER What changed:  Another medication with the same name was removed. Continue taking this medication, and follow the directions you see here. Your last dose was: Your next dose is:    
   
   
 Dose:  2 Cap Take 2 Caps by mouth as needed. Lactase dairy digestive Refills:  0  
     
   
   
   
  
 * Notice: This list has 2 medication(s) that are the same as other medications prescribed for you. Read the directions carefully, and ask your doctor or other care provider to review them with you. CONTINUE these medications which have NOT CHANGED Dose & Instructions Dispensing Information Comments Morning Noon Evening Bedtime  
 benazepril 40 mg tablet Commonly known as:  LOTENSIN Your last dose was: Your next dose is:    
   
   
 Dose:  40 mg Take 40 mg by mouth daily. Refills:  0  
     
   
   
   
  
 bisoprolol-hydroCHLOROthiazide 10-6.25 mg per tablet Commonly known as:  [facility] Your last dose was: Your next dose is:    
   
   
 Dose:  1 Tab Take 1 Tab by mouth daily. Refills:  0 CENTRUM SILVER PO Your last dose was: Your next dose is:    
   
   
 Dose:  1 Tab Take 1 Tab by mouth daily. Refills:  0 PEPCID 20 mg tablet Generic drug:  famotidine Your last dose was: Your next dose is:    
   
   
 Dose:  20 mg Take 20 mg by mouth two (2) times a day. Takes with breakfast & dinner Refills:  0  
     
   
   
   
  
 VITAMIN C 500 mg tablet Generic drug:  ascorbic acid (vitamin C) Your last dose was: Your next dose is:    
   
   
 Dose:  500 mg Take 500 mg by mouth daily. Refills:  0 STOP taking these medications ALEVE 220 mg Cap Generic drug:  naproxen sodium  
   
  
 cilostazol 100 mg tablet Commonly known as:  PLETAL  
   
  
 vitamin E 400 unit capsule Commonly known as:  [address] Mu[address] Where to Get Your Medications Information on where to get these meds will be given to you by the nurse or doctor. ! Ask your nurse or doctor about these medications  
  cyclobenzaprine 5 mg tablet  
 docusate sodium 100 mg capsule  
 oxyCODONE-acetaminophen 5-325 mg per tablet  
 promethazine 25 mg tablet

## 2017-08-21 NOTE — ANESTHESIA PREPROCEDURE EVALUATION
Anesthetic History          Comments: Slow emergence     Review of Systems / Medical History  Patient summary reviewed, nursing notes reviewed and pertinent labs reviewed    Pulmonary  Within defined limits                 Neuro/Psych   Within defined limits           Cardiovascular    Hypertension: well controlled          Hyperlipidemia    Exercise tolerance: >4 METS     GI/Hepatic/Renal     GERD: well controlled           Endo/Other        Arthritis and cancer    Comments: History of prostate cancer Other Findings            Physical Exam    Airway  Mallampati: I  TM Distance: 4 - 6 cm  Neck ROM: normal range of motion   Mouth opening: Normal     Cardiovascular    Rhythm: regular  Rate: normal         Dental  No notable dental hx       Pulmonary  Breath sounds clear to auscultation               Abdominal  GI exam deferred       Other Findings            Anesthetic Plan    ASA: 2  Anesthesia type: general          Induction: Intravenous  Anesthetic plan and risks discussed with: Patient and Spouse      Informed consent obtained.

## 2017-08-22 LAB
ANION GAP SERPL CALC-SCNC: 6 MMOL/L (ref 5–15)
BUN SERPL-MCNC: 28 MG/DL (ref 6–20)
BUN/CREAT SERPL: 15 (ref 12–20)
CALCIUM SERPL-MCNC: 8.6 MG/DL (ref 8.5–10.1)
CHLORIDE SERPL-SCNC: 105 MMOL/L (ref 97–108)
CO2 SERPL-SCNC: 29 MMOL/L (ref 21–32)
CREAT SERPL-MCNC: 1.9 MG/DL (ref 0.7–1.3)
ERYTHROCYTE [DISTWIDTH] IN BLOOD BY AUTOMATED COUNT: 13.6 % (ref 11.5–14.5)
GLUCOSE SERPL-MCNC: 151 MG/DL (ref 65–100)
HCT VFR BLD AUTO: 42.7 % (ref 36.6–50.3)
HGB BLD-MCNC: 13.7 G/DL (ref 12.1–17)
MCH RBC QN AUTO: 30.6 PG (ref 26–34)
MCHC RBC AUTO-ENTMCNC: 32.1 G/DL (ref 30–36.5)
MCV RBC AUTO: 95.5 FL (ref 80–99)
PLATELET # BLD AUTO: 191 K/UL (ref 150–400)
POTASSIUM SERPL-SCNC: 5.3 MMOL/L (ref 3.5–5.1)
POTASSIUM SERPL-SCNC: 5.3 MMOL/L (ref 3.5–5.1)
RBC # BLD AUTO: 4.47 M/UL (ref 4.1–5.7)
SODIUM SERPL-SCNC: 140 MMOL/L (ref 136–145)
WBC # BLD AUTO: 11.1 K/UL (ref 4.1–11.1)

## 2017-08-22 PROCEDURE — 74011250636 HC RX REV CODE- 250/636: Performed by: ORTHOPAEDIC SURGERY

## 2017-08-22 PROCEDURE — 85027 COMPLETE CBC AUTOMATED: CPT | Performed by: ORTHOPAEDIC SURGERY

## 2017-08-22 PROCEDURE — 65270000029 HC RM PRIVATE

## 2017-08-22 PROCEDURE — 74011250637 HC RX REV CODE- 250/637: Performed by: ORTHOPAEDIC SURGERY

## 2017-08-22 PROCEDURE — 77010033678 HC OXYGEN DAILY

## 2017-08-22 PROCEDURE — 93922 UPR/L XTREMITY ART 2 LEVELS: CPT

## 2017-08-22 PROCEDURE — 84132 ASSAY OF SERUM POTASSIUM: CPT | Performed by: PHYSICIAN ASSISTANT

## 2017-08-22 PROCEDURE — 80048 BASIC METABOLIC PNL TOTAL CA: CPT | Performed by: ORTHOPAEDIC SURGERY

## 2017-08-22 PROCEDURE — 74011250637 HC RX REV CODE- 250/637: Performed by: NURSE PRACTITIONER

## 2017-08-22 PROCEDURE — 97116 GAIT TRAINING THERAPY: CPT

## 2017-08-22 PROCEDURE — 36415 COLL VENOUS BLD VENIPUNCTURE: CPT | Performed by: ORTHOPAEDIC SURGERY

## 2017-08-22 PROCEDURE — 97163 PT EVAL HIGH COMPLEX 45 MIN: CPT

## 2017-08-22 RX ORDER — OXYCODONE AND ACETAMINOPHEN 10; 325 MG/1; MG/1
1 TABLET ORAL
Status: DISCONTINUED | OUTPATIENT
Start: 2017-08-22 | End: 2017-08-24 | Stop reason: HOSPADM

## 2017-08-22 RX ORDER — HYDROMORPHONE HYDROCHLORIDE 1 MG/ML
1 INJECTION, SOLUTION INTRAMUSCULAR; INTRAVENOUS; SUBCUTANEOUS
Status: DISCONTINUED | OUTPATIENT
Start: 2017-08-22 | End: 2017-08-24 | Stop reason: HOSPADM

## 2017-08-22 RX ORDER — CHOLECALCIFEROL (VITAMIN D3) 125 MCG
6000 CAPSULE ORAL
Status: DISCONTINUED | OUTPATIENT
Start: 2017-08-22 | End: 2017-08-24 | Stop reason: HOSPADM

## 2017-08-22 RX ORDER — OXYCODONE AND ACETAMINOPHEN 5; 325 MG/1; MG/1
1 TABLET ORAL
Status: DISCONTINUED | OUTPATIENT
Start: 2017-08-22 | End: 2017-08-24 | Stop reason: HOSPADM

## 2017-08-22 RX ADMIN — OXYCODONE HYDROCHLORIDE AND ACETAMINOPHEN 1 TABLET: 10; 325 TABLET ORAL at 22:25

## 2017-08-22 RX ADMIN — VITAMIN D, TAB 1000IU (100/BT) 1000 UNITS: 25 TAB at 09:00

## 2017-08-22 RX ADMIN — FAMOTIDINE 20 MG: 20 TABLET, FILM COATED ORAL at 14:22

## 2017-08-22 RX ADMIN — SODIUM CHLORIDE 125 ML/HR: 900 INJECTION, SOLUTION INTRAVENOUS at 03:35

## 2017-08-22 RX ADMIN — BISOPROLOL FUMARATE AND HYDROCHLOROTHIAZIDE 1 TABLET: 6.25; 1 TABLET ORAL at 09:00

## 2017-08-22 RX ADMIN — DOCUSATE SODIUM 100 MG: 100 CAPSULE ORAL at 14:22

## 2017-08-22 RX ADMIN — BENAZEPRIL HYDROCHLORIDE 40 MG: 10 TABLET, COATED ORAL at 22:25

## 2017-08-22 RX ADMIN — Medication 10 ML: at 14:00

## 2017-08-22 RX ADMIN — CEFAZOLIN 2 G: 1 INJECTION, POWDER, FOR SOLUTION INTRAMUSCULAR; INTRAVENOUS; PARENTERAL at 22:25

## 2017-08-22 RX ADMIN — CEFAZOLIN 2 G: 1 INJECTION, POWDER, FOR SOLUTION INTRAMUSCULAR; INTRAVENOUS; PARENTERAL at 15:32

## 2017-08-22 RX ADMIN — CEFAZOLIN 2 G: 1 INJECTION, POWDER, FOR SOLUTION INTRAMUSCULAR; INTRAVENOUS; PARENTERAL at 06:54

## 2017-08-22 RX ADMIN — Medication 10 ML: at 22:25

## 2017-08-22 RX ADMIN — Medication 10 ML: at 06:54

## 2017-08-22 NOTE — CONSULTS
Kermit Rodriguez [facility] 79   201 [facility], [address] [place], [place] [address]   [address] [facility]       Name:  Trip Mccullough   MR#:  [id number]   :  1940   Account #:  [de-identified]    Date of Consultation:  2017   Date of Adm:  2017       CHIEF COMPLAINT: Peripheral vascular disease. HISTORY OF PRESENT ILLNESS: The patient is a 30-year-old male   who underwent lumbar laminectomy by Dr. Ana Rosa Munguia yesterday for leg pain   and dysfunction on the left. He has done well postoperatively and has   improvement of his neurologic symptoms in his left leg; however, he   was noted on physical exam to have decreased pulses and we are   subsequently asked to see him for same. He has been seen by Dr. Virginia Bird in the past for vasculogenic claudication and has been treated   successfully with Pletal with improvement in symptoms. The patient is   currently without rest pain. He says that his neurologic function has   improved. PAST MEDICAL HISTORY: Hypertension, hyperlipidemia. FAMILY HISTORY: Noncontributory. SOCIAL HISTORY: He does not smoke. He does not drink excessively. He does not use illicit drugs. MEDICATIONS: Please see list.    REVIEW OF SYSTEMS   Denies fever, chills, weight loss or chest pain. Denies shortness of   breath. Denies nausea, vomiting, diarrhea, abdominal pain, any   dysuria or urinary frequency. Denies any neurologic, hematologic or   psychiatric illnesses. PHYSICAL EXAMINATION   GENERAL: Elderly male in no acute distress. HEENT: Sclerae are anicteric. NECK: Supple, no lymphadenopathy. LUNGS: Clear. HEART: Regular rate and rhythm. ABDOMEN: Benign. EXTREMITIES: Warm. I am unable to palpate pulses in his feet. He   appears to be neurologically intact with some decreased dorsiflexion of   the left foot. ASSESSMENT AND PLAN: Elderly male with a history of vasculogenic   claudication and decreased pulses in his left foot.  I do not think that   there is something acutely sinister going on; however, we will check   PVRs to document adequacy of circulation and to better stratify future   care. This has been discussed with the patient in detail. Will make   further plans pending results of future testing. Thank you very much for asking us to see him. We will follow his   hospital course with you.         MD YASMANY Garza / CARLOZ   D:  08/22/2017   09:45   T:  08/22/2017   10:01   Job #:  [id number]

## 2017-08-22 NOTE — PERIOP NOTES
Pt. Awakened complaining of severe left calf pain. He has a positive Evette's sign but no apparent redness or swelling. Pain significantly worse with palpation and flexion. Anesthesia evaluated patient and defers to surgeon for treatment. RAQUEL Roman was paged and notified. Orders received for left lower extremity doppler and to give patient his Valium when he is arousable enough to do so safely. After Dilaudid, patient is now slightly more comfortable. He will be transferred to his room when stable from an anesthesia standpoint and doppler results called to Abel if positive. 2100 - Received a callback from Macy Fishman, [address] for Dr. Richelle Reyes. Per Edie Aparicio, continue with doppler, and she may order steroids as well. If doppler is positive for DVT, notify Edie Aparicio for heparin dosing. Otherwise, any further questions can go through on call PA.

## 2017-08-22 NOTE — CONSULTS
[address]  [address] [place] [address]  [phone]    Hospitalist Consult Note      NAME:  Jennifer Myers   :   1940   MRN:  [id number]     Requesting Physician Barbie Sanchez MD   Reason for Consult:  Medical management      PCP:  Jennifer Michael MD     Date/Time:  2017 10:36 PM       Assessment and plan:   1. Acute DVT (deep venous thrombosis) ([address] (2017). This is distal DVT. In general, symptomatic distal DVT anticoagulation is indicated provided the risk of bleeding is low. Due to his recent surgery( just came from OR after spinal surgery) would hold anti coagulation for now. When it is safe may need to start anti coagulation as pt is symptomatic and his activity would be minimal after back surgery. Discussesed with Clinton Huntley. 2.  HTN (hypertension) (2017). BP is high likely secondary to severe leg pain. Continue benazepril and add hydalazine PRN. Watch renal function     3. Renal insufficiency (2017). unknown baseline creatinine. Continue IVF and check renal function. 4.  Lumbar stenosis with neurogenic claudication (2017). Management per orthopedics. Thank you for the consult. Will follow along with you. Subjective:     CHIEF COMPLAINT: LT leg and back pain      HISTORY OF PRESENT ILLNESS:     Mr. Suki Mederos is a 78 y.o.  male who is admitted to the orthopedics Service with chronic back pain. We are asked to evaluate for acute DVT. Mr. Suki Mederos with PMH of chronic back pain was admitted for elective back surgery. Had chronic back pain for many years. While in PACU, pt started to have severe LT calf pain, which is sharp without radiation. The pain became progressively worse. Denies trauma or swelling. Leg venous doppler: Left lower extremity positive for acute deep vein thrombosis involving 1 of 2 paired posterior tibial veins in the calf.       Past Medical History:   Diagnosis Date  Adverse effect of anesthesia     slow to wake up    Arthritis     Cancer [facility])     prostate    Chronic pain     back pain    Hypertension     Ill-defined condition     perpherial vascular disease        Past Surgical History:   Procedure Laterality Date    HX HEENT      tonsils as child    HX ORTHOPAEDIC Bilateral 2003    knee replacement    HX PROSTATECTOMY  2012       Social History   Substance Use Topics    Smoking status: Former Smoker     Packs/day: 0.50     Years: 20.00     Quit date: 1/1/1992    Smokeless tobacco: Former User    Alcohol use Yes      Comment: rare beer        History reviewed. No pertinent family history. Allergies   Allergen Reactions    Peanut Angioedema    Penicillins Hives     Bad hives after penicillin injection, unsure if he has ever had a cephalosporin        Prior to Admission medications    Medication Sig Start Date End Date Taking? Authorizing Provider   benazepril (LOTENSIN) 40 mg tablet Take 40 mg by mouth daily. Yes Historical Provider   bisoprolol-hydroCHLOROthiazide [facility]) 10-6.25 mg per tablet Take 1 Tab by mouth daily. Yes Historical Provider   OTHER Take 2 Caps by mouth as needed. Lactase dairy digestive    Yes Historical Provider   famotidine (PEPCID) 20 mg tablet Take 20 mg by mouth two (2) times a day. Takes with breakfast & dinner   Yes Historical Provider   cilostazol (PLETAL) 100 mg tablet Take 100 mg by mouth two (2) times a day. Takes before breakfast and 2 hours after dinner    Historical Provider   ascorbic acid, vitamin C, (VITAMIN C) 500 mg tablet Take 500 mg by mouth daily. Historical Provider   vitamin E (AQUA GEMS) 400 unit capsule Take  by mouth daily. Historical Provider   FOLIC ACID/MULTIVIT-MIN/LUTEIN (CENTRUM SILVER PO) Take 1 Tab by mouth daily. Historical Provider   OTHER Take 1,400 mg by mouth daily. Fish oil    Historical Provider   naproxen sodium (ALEVE) 220 mg cap Take 1 Tab by mouth two (2) times daily as needed. Takes at breakfast and bedtime as needed    Historical Provider   OTHER Take 1 Cap by mouth daily.  Vitamin D 3 1000 mg    Historical Provider         Current Facility-Administered Medications:     benazepril (LOTENSIN) tablet 40 mg, 40 mg, Oral, DAILY, Fred Wilson MD    [START ON 8/22/2017] bisoprolol-hydroCHLOROthiazide (ZIAC) 10-6.25 mg per tablet 1 Tab, 1 Tab, Oral, DAILY, Fred Wilson MD    famotidine (PEPCID) tablet 20 mg, 20 mg, Oral, BID, Fred Wilson MD    HYDROmorphone (PF) 15 mg/30 ml (DILAUDID) PCA, , IntraVENous, TITRATE, Fred Wilson MD    0.9% sodium chloride infusion, 125 mL/hr, IntraVENous, CONTINUOUS, Fred Wilson MD, Last Rate: 125 mL/hr at 08/21/17 1915, 125 mL/hr at 08/21/17 1915    sodium chloride (NS) flush 5-10 mL, 5-10 mL, IntraVENous, Q8H, Fred Wilson MD    sodium chloride (NS) flush 5-10 mL, 5-10 mL, IntraVENous, PRN, Fred Wilson MD    diazePAM (VALIUM) tablet 5 mg, 5 mg, Oral, Q6H PRN, Fred Wilson MD    ceFAZolin in 0.9% NS (ANCEF) IVPB soln 2 g, 2 g, IntraVENous, Q8H, Fred Wilson MD    acetaminophen (TYLENOL) tablet 650 mg, 650 mg, Oral, Q4H PRN, Fred Wilson MD    oxyCODONE-acetaminophen (PERCOCET 10)  mg per tablet 1 Tab, 1 Tab, Oral, Q4H PRN, Fred Wilson MD    HYDROmorphone (PF) (DILAUDID) injection 1 mg, 1 mg, IntraVENous, Q4H PRN, Fred Wilson MD    [facility]) injection 0.4 mg, 0.4 mg, IntraVENous, PRN, Fred Wilson MD    ondansetron [facility]) injection 4 mg, 4 mg, IntraVENous, Q4H PRN, Fred Wilson MD    diphenhydrAMINE (BENADRYL) capsule 25 mg, 25 mg, Oral, Q4H PRN, Fred Wilson MD  [facility]  Tessa Martins ON 8/22/2017] docusate sodium (COLACE) capsule 100 mg, 100 mg, Oral, BID, Fred Wilson MD    dexamethasone (PF) (DECADRON) injection 6 mg, 6 mg, IntraVENous, NOW, RAQUEL Najera    [START ON 8/22/2017] cholecalciferol (VITAMIN D3) tablet 1,000 Units, 1,000 Units, Oral, DAILY, Fred Wilson MD      Review of Systems:  (bold if positive, if negative)    Gen:  Eyes:  ENT: CVS:  Pulm:  GI:    :    MS:  Skin:  Psych:  Endo:    Hem:  Renal:    Neuro:            Objective:      VITALS:    Vital signs reviewed; most recent are:    Visit Vitals    BP (!) 162/93 (BP 1 Location: Right arm, BP Patient Position: At rest)    Pulse 91    Temp 97.4 °F (36.3 °C)    Resp 16    Ht 6' (1.829 m)    Wt 84.5 kg (186 lb 4.6 oz)    SpO2 98%    BMI 25.27 kg/m2     SpO2 Readings from Last 6 Encounters:   08/21/17 98%   08/16/17 96%    O2 Flow Rate (L/min): 3 l/min     Intake/Output Summary (Last 24 hours) at 08/21/17 2236  Last data filed at 08/21/17 2220   Gross per 24 hour   Intake             2200 ml   Output              650 ml   Net             1550 ml            Exam:     Physical Exam:    Gen:  Well-developed, well-nourished, in no acute distress  HEENT:  Pink conjunctivae, PERRL, hearing intact to voice, moist mucous membranes  Neck:  Supple, without masses, thyroid non-tender  Resp:  No accessory muscle use, clear breath sounds without wheezes rales or rhonchi  Card:  No murmurs, normal S1, S2 without thrills, bruits or peripheral edema  Abd:  Soft, non-tender, non-distended, normoactive bowel sounds are present, no palpable organomegaly and no detectable hernias  Lymph:  No cervical or inguinal adenopathy  Musc:  No cyanosis or clubbing  Skin:  No rashes or ulcers, skin turgor is good  Neuro:  Cranial nerves are grossly intact, no focal motor weakness, follows commands appropriately  Psych:  Good insight, oriented to person, place and time, alert       Labs:    No results for input(s): WBC, HGB, HCT, PLT, HGBEXT, HCTEXT, PLTEXT in the last 72 hours. No results for input(s): NA, K, CL, CO2, GLU, BUN, CREA, CA, MG, PHOS, ALB, TBIL, TBILI, SGOT, ALT in the last 72 hours. No results found for: GLUCPOC  No results for input(s): PH, PCO2, PO2, HCO3, FIO2 in the last 72 hours. No results for input(s): INR in the last 72 hours.     No lab exists for component: INREXT    Telemetry reviewed: Risk of deterioration: high      Total time spent with patient: 79 [facility] discussed with: Patient, Nursing Staff, Consultant/Specialist and >50% of time spent in counseling and coordination of care    Discussed:  Care Plan       ___________________________________________________    Attending Physician: Pavel Shultz MD

## 2017-08-22 NOTE — PERIOP NOTES
TRANSFER - OUT REPORT:    Verbal report given to Estefania Gavin RN  on Isabel Nichols  being transferred to [address] [place] [address] for routine post - op       Report consisted of patients Situation, Background, Assessment and   Recommendations(SBAR). Information from the following report(s) SBAR, OR Summary, Intake/Output, MAR, Recent Results and Cardiac Rhythm NSR was reviewed with the receiving nurse. Lines:   Peripheral IV 08/21/17 Left Forearm (Active)   Site Assessment Clean, dry, & intact 8/21/2017  9:00 PM   Phlebitis Assessment 0 8/21/2017  9:00 PM   Infiltration Assessment 0 8/21/2017  9:00 PM   Dressing Status Clean, dry, & intact 8/21/2017  9:00 PM   Dressing Type Transparent;Tape 8/21/2017  9:00 PM   Hub Color/Line Status Green;Patent; Infusing 8/21/2017  9:00 PM   Alcohol Cap Used Yes 8/21/2017  9:00 PM        Opportunity for questions and clarification was provided. Patient transported with:   O2 @ 3 liters  Registered Nurse            Discussed with receiving nurse new diagnosis of left leg DVT. Per Delroy Betancourt, patient is ok to come to his room, orders to follow after she discusses his case with Dr. Mell Verdugo. Receiving nurse is aware.

## 2017-08-22 NOTE — PROGRESS NOTES
PT Note- Orders received and chart reviewed. Noted patient with LLE acute DVT, POD #1 laminectomy. Spoke with RN. Patient's POC in progress, not on anticoagulation. Will delay PT until POC per chart. Greeted patient and informed him PT will monitor his status and follow up with Dr. Ana Rosa Munguia to ensure plan is to hold on PT today.  Will follow- thank you  iNkita Malloy, PT

## 2017-08-22 NOTE — PROGRESS NOTES
Problem: Mobility Impaired (Adult and Pediatric)  Goal: *Acute Goals and Plan of Care (Insert Text)  Physical Therapy Goals  Initiated 8/22/2017  1. Patient will move from supine to sit and sit to supine , scoot up and down and roll side to side in bed with modified independence within 7 day(s). 2. Patient will transfer from bed to chair and chair to bed with supervision/set-up using the least restrictive device within 7 day(s). 3. Patient will perform sit <> stand with minimal assistance/contact guard assist within 7 day(s). 4. Patient will ambulate with minimal assistance/contact guard assist for 50 feet with the least restrictive device within 7 day(s). 5. Patient will ascend/descend 3 stairs with 2 handrail(s) with minimal assistance/contact guard assist within 7 day(s). PHYSICAL THERAPY EVALUATION  Patient: Sandi Ontiveros (4 y.o. male)  Date: 8/22/2017  Primary Diagnosis: Bilateral stenosis of lateral recess of lumbar spine [M48.06]  Procedure(s) (LRB):  L3-L5 LAMINECTOMY, FUSION, INSTRUMENTATION, TLIF, BONE GRAFT (N/A) 1 Day Post-Op   Precautions:  Back, Fall      ASSESSMENT :  Based on the objective data described below, the patient presents with poor strength BLEs, poor circulation BLEs, and poor dynamic standing balance. Patient requires minimal to moderate assist of 2 to come to standing, and has loss of balance 3x while ambulating due to full buckling of right knee. Patient reports he has been very limited ability with basic mobility for the last 3-4 months due to RLE weakness. He reports when he awoke from surgery he had difficulty with left ankle DF. This afternoon he has trace left ankle DF. Patient's bilateral legs and feet cold to touch and dark red discoloration of fore foot. Patient has PMH of PVD BLEs. Patient had work up for rule out of DVT and this am patient found positive for LLE DVT. Patient's am therapy held until plan of care determined related to anticoagulation.  Received communication at ~12:30 pm per Viktor Carr NP that per Dr. [employer] patient should mobilize. Per Kofi Valera.ALEXANDREA she received verbal orders per Dr. [employer] to proceed with PT/OOB. She reports plan is to not anticoagulate patient due to risk for bleeding at spine incision. She reports ortho suspects DVT not new but chronic since he has been quite debilitated for extensive period. Upon history taking, patient reports having injury to left leg when he hit leg accidentally against item in wood shed several weeks ago. Left message with Fidel Lemon who I was told hospitalist for patient today however no return call received and later this afternoon learned  not on duty. Asked per orthopedics to proceed with PT. Voiced concerns and awaited input most of afternoon but proceeded as requested this late afternoon. Per Viktor Carr NP not planning anticoagulation as patient POD #1 spine surgery and at risk for bleeding. Patient asymptomatic during activity and VSS, essentially unchanged pre-post. Patient up to chair and advised patient, family and RN he is assist of 2 for SPT , moderate buckling and tends to downplay his deficits. Patient can be slightly impulsive at times. He should benefit from PT BID and will work toward established goals. Plan pre op is for patient to go home with HHPT however given degree of severe weakness he may need inpatient rehab setting prior to be safe to discharge to home. Discussed potential need for rehab with NP only. Patient will benefit from skilled intervention to address the above impairments.   Patients rehabilitation potential is considered to be Good  Factors which may influence rehabilitation potential include:   [ ]         None noted  [ ]         Mental ability/status  [x ]         Medical condition  [ ]         Home/family situation and support systems  [ ]         Safety awareness  [ ]         Pain tolerance/management  [ ]         Other:        PLAN :  Recommendations and Planned Interventions:  [x ]           Bed Mobility Training             [ ]    Neuromuscular Re-Education  [x ]           Transfer Training                   [ ]    Orthotic/Prosthetic Training  [x ]           Gait Training                         [ ]    Modalities  [x ]           Therapeutic Exercises           [ ]    Edema Management/Control  [x ]           Therapeutic Activities            [x ]    Patient and Family Training/Education  [ ]           Other (comment):     Frequency/Duration: Patient will be followed by physical therapy  twice daily to address goals. Discharge Recommendations: Home Health vs Inpatient Rehab  Further Equipment Recommendations for Discharge: TBD - patient is against use of RW, prefers standard walker. Has crutches. Will attempt to persuade to RW. SUBJECTIVE:   Patient stated I was using cabinets and furniture to push myself up to manage at home.       OBJECTIVE DATA SUMMARY:   HISTORY:    Past Medical History:   Diagnosis Date    Adverse effect of anesthesia       slow to wake up    Arthritis      Cancer ([facility] [address]       prostate    Chronic pain       back pain    Hypertension      Ill-defined condition       perpherial vascular disease     Past Surgical History:   Procedure Laterality Date    HX HEENT         tonsils as child    HX ORTHOPAEDIC Bilateral 2003     knee replacement    HX PROSTATECTOMY   2012     Prior Level of Function/Home Situation: impaired strength especially RLE and pain across lumbar spine and radiating into LEs, debilitated x last 3 months but ambulating holding onto furniture  Personal factors and/or comorbidities impacting plan of care: Decreased insight to deficits, slightly impulsive, chronic LBP, advanced PVD     Home Situation  Home Environment: Private residence  # Steps to Enter: 3  One/Two Story Residence: One story  Living Alone: No  Support Systems: Spouse/Significant Other/Partner  Patient Expects to be Discharged to[de-identified] Private residence  Current DME Used/Available at Home: Walker     EXAMINATION/PRESENTATION/DECISION MAKING:   Critical Behavior:  Neurologic State: Alert  Orientation Level: Oriented X4  Cognition: Follows commands  Safety/Judgement: Decreased awareness of need for safety, Decreased insight into deficits  Hearing: Auditory  Auditory Impairment: None  Hearing Aids/Status: Does not own  Skin: cold left forefoot, dark red discoloration left forefoot  Range Of Motion:  AROM: Generally decreased, functional           PROM: Generally decreased, functional           Strength:    Strength: Generally decreased, functional   Hip Flexion R: 2/5  L: 2+/5  Knee ext  R: 3/5  L 3+/5  Ankle DF  R: 3/5  L: 1/5  Ankle PF  R: 3+/5  L: 3+/5                    Tone & Sensation:   Tone: Normal              Sensation: Intact               Coordination:  Coordination: Generally decreased, functional        Functional Mobility:  Bed Mobility:  Rolling: Minimum assistance  Supine to Sit: Minimum assistance     Scooting: Contact guard assistance  Transfers:  Sit to Stand: Minimum assistance; Additional time;Assist x2  Stand to Sit: Minimum assistance; Additional time;Assist x2  Stand Pivot Transfers: Assist x2     Bed to Chair: Minimum assistance; Additional time;Assist x2              Balance:   Sitting: Intact; With support  Standing: Impaired  Standing - Static: Constant support; Fair  Standing - Dynamic : Poor (shahid at right knee)  Ambulation/Gait Training:  Distance (ft): 40 Feet (ft)  Assistive Device: Brace/Splint;Gait belt;Walker, rolling  Ambulation - Level of Assistance: Moderate assistance; Additional time;Assist x2  Gait Abnormalities: Antalgic;Decreased step clearance        Stairs - Level of Assistance:  (NT)     Functional Measure:  Barthel Index 65/100     G codes: In compliance with CMSs Claims Based Outcome Reporting, the following G-code set was chosen for this patient based on their primary functional limitation being treated:      The outcome measure chosen to determine the severity of the functional limitation was the Barthel Index with a score of 65/100 which was correlated with the impairment scale. · Mobility - Walking and Moving Around:               - CURRENT STATUS:    CJ - 20%-39% impaired, limited or restricted               - GOAL STATUS:           CI - 1%-19% impaired, limited or restricted               - D/C STATUS:                       ---------------To be determined---------------      Physical Therapy Evaluation Charge Determination   History Examination Presentation Decision-Making   MEDIUM  Complexity : 1-2 comorbidities / personal factors will impact the outcome/ POC  MEDIUM Complexity : 3 Standardized tests and measures addressing body structure, function, activity limitation and / or participation in recreation  MEDIUM Complexity : Evolving with changing characteristics  Other outcome measures Barthel Index MEDIUM      Based on the above components, the patient evaluation is determined to be of the following complexity level: MEDIUM     Pain:  Pain Scale 1: Numeric (0 - 10)  Pain Intensity 1: 2              Activity Tolerance:   Limited   Please refer to the flowsheet for vital signs taken during this treatment. After treatment:   [X]         Patient left in no apparent distress sitting up in chair  [ ]         Patient left in no apparent distress in bed  [X]         Call bell left within reach  [X]         Nursing notified  [X]         Caregiver present  [ ]         Bed alarm activated      COMMUNICATION/EDUCATION:   The patients plan of care was discussed with: Registered Nurse.  [X]         Fall prevention education was provided and the patient/caregiver indicated understanding. [X]         Patient/family have participated as able in goal setting and plan of care. [ ]         Patient/family agree to work toward stated goals and plan of care.   [ ]         Patient understands intent and goals of therapy, but is neutral about his/her participation. [ ]         Patient is unable to participate in goal setting and plan of care.      Thank you for this referral.  Sarah Marley, PT   Time Calculation: 30 mins

## 2017-08-22 NOTE — INTERDISCIPLINARY ROUNDS
[address]    Patient Information    Name: Erinn Segovia  Age: 78 y.o. Admission Date: 8/21/2017  Surgery/Procedure: Procedure(s):  L3-L5 LAMINECTOMY, FUSION, INSTRUMENTATION, TLIF, BONE GRAFT  Attending Provider: Regis Faulkner MD  Surgeon: Elian Ferguson   Problem List: Active Problems:    Lumbar stenosis with neurogenic claudication (8/21/2017)      DVT (deep venous thrombosis) ([address] (8/21/2017)      HTN (hypertension) (8/21/2017)      Renal insufficiency (8/21/2017)      During rounds the following quality care indicators and evidence based practices were addressed :       PT/OT: Patient mobility - has not been seen                       Pain Assessment  Pain Intensity 1: 2 (08/22/17 4036)  Pain Location 1: Leg  Pain Intervention(s) 1: Encouraged PCA  Patient Stated Pain Goal: 2    Pain meds: dilaudid PCA, percocet  Antibiotics completed: No  Anticoagulation:  None at this time  Bowel regimen: colace  Mechanical DVT prophylaxis: TEDs  Mallory: N   Goals For Today: Progress with PT, pain control    RRAT Score:      Readmit Risk Tool  Support Systems: Spouse/Significant Other/Partner    Discharge Management/Planning:    Readmit Risk Assessment Information:   Low Risk            7       Total Score        2 . Living with Significant Other. Assisted Living. LTAC. SNF. or   Rehab    5 Pt. Coverage (Medicare=5 , Medicaid, or Self-Pay=4)        Criteria that do not apply:    Has Seen PCP in Last 6 Months (Yes=3, No=0)    Patient Length of Stay (>5 days = 3)    IP Visits Last 12 Months (1-3=4, 4=9, >4=11)    Charlson Comorbidity Score (Age + Comorbid Conditions)         Readmit Risk Tool  Support Systems: Spouse/Significant [address] [place]: TBD  Rehab/SNF Facility:  Anticipated Date of Discharge: D  Barrier to Discharge (if any): n/a    Interdisciplinary team rounds were held with the following team members: Nurse Practitioner, Nursing, Pharmacy, and Rehab.  See clinical pathway and/or care plan for interventions and desired outcomes.

## 2017-08-22 NOTE — PROGRESS NOTES
10:43 PM   Spoke w/ on call physician Dr. Elias Desir about US result and anti-coagulation. He is aware that the Hospitalist has already been consulted. Dr. Elias Desir recommends subcutaneous heparin at prophylactic dose if pt needs to start anticoagulation tonight. He can then be started on therapeutic dosing of heparin when ok w/ Dr. Amanda Smith. 10:50 PM   Spoke w/ Hospitalist, Dr. Amadou Smith. Upon discussion w/ hospitalist, we will hold anticoagulation until the morning. Will hold due to pt being POD 0 and risk of increased bleeding/epidural hematoma. Pt has already had 300 cc of output from lumbar hemovac drain.  Will start anticoagulation in the AM after discussion w/ Dr. Amanda Smith as recommended by [address] [place] [address], PA-SILVIA  Orthopaedic Surgery  Physician Assistant to Dr. Heather Diamond

## 2017-08-22 NOTE — PROCEDURES
[address]  *** FINAL REPORT ***    Name: Radha Lew  MRN: [id number]    Inpatient  : 18 Oct 1940  HIS Order #: [id number]  [id number] [facility] Visit #: [id number]  Date: 21 Aug 2017    TYPE OF TEST: Peripheral Venous Testing    REASON FOR TEST  Pain in limb, Limb swelling    Left Leg:-  Deep venous thrombosis:           Yes  Proximal extent of thrombus:      Posterior Tibial  Superficial venous thrombosis:    No  Deep venous insufficiency:        No  Superficial venous insufficiency: No      INTERPRETATION/FINDINGS  PROCEDURE:  LEFT LOWER EXTREMITY VENOUS DUPLEX . Evaluation of lower  extremity veins with ultrasound (B-mode imaging, pulsed Doppler, color   Doppler). Includes the common femoral, deep femoral, femoral,  popliteal, posterior tibial, peroneal, and great saphenous veins. Other veins, for example the gastrocnemius and soleal veins, may also  be visualized. FINDINGS:In the left lower extremity 1 of 2 paired posterior tibial  veins were seen with no compression, color fill or augmentation. Thrombus appears acute. Gray scale and color flow duplex images of the   remaining veins in the left lower extremity demonstrate normal  compressibility, spontaneous and augmented flow profiles, and absence  of filling defects throughout the deep and superficial veins in the  left lower extremity. CONCLUSION: Left lower extremity venous duplex positive for acute deep   vein thrombosis involving 1 of 2 paired posterior tibial veins in the   calf. Right common femoral vein is thrombus free. ADDITIONAL COMMENTS    I have personally reviewed the data relevant to the interpretation of  this  study. TECHNOLOGIST: Sly Acharya. [place]  Signed: 2017 09:41 PM    PHYSICIAN: Mo Marsh.  Kal Owen MD  Signed: 2017 08:46 AM

## 2017-08-22 NOTE — PROGRESS NOTES
Patient seen and examined  Full consult to be dictated  78 yowm s/p laminectomy has decreased pulses in his left foot  He has an antecedant history of foot drop from nerve entrapment which he reports is already improved. He has seen Dr Alesha Michel for claudication and has been treated with Pletal  Foot is warm with no pulses as expected  I dont think he has anything acutely sinister going on. Will check PVRs during this visit to help stratify future care. I think arterial duplex less helpful here. Thank you for the consult.

## 2017-08-22 NOTE — PROGRESS NOTES
8/22/2017 9:52 AM Case management consult for home health received. Met with pt. Charted address and phone number confirmed. Pt lives with his wife in [place]. Pt confirmed his wife will be able to assist if needed after discharge. Pt was independent with adls and driving prior to admission. Has rx coverage and fills his scripts at [facility]. Pt owns a walker(no wheels) and crutches. Pt has had home health in the past through [facility] and selected to use this agency again. Referral sent to [facility] via [employer]. Pt's wife will transport pt home. CM will follow. LAURIE Dumont  Care Management Interventions  PCP Verified by CM:  Yes (Kelsey Phillips )  Transition of Care Consult (CM Consult): [address]: No  Reason Outside [place]: Physician referred to specific agency  MyChart Signup: No  Discharge Durable Medical Equipment: No  Physical Therapy Consult: Yes  Occupational Therapy Consult: Yes  Speech Therapy Consult: No  Current Support Network: Lives with Spouse, Own Home  Discharge Location  Discharge Placement: Home with home health

## 2017-08-22 NOTE — PROGRESS NOTES
[facility] [address] [facility]  [address] [facility], [address] [place] [address]  [phone]      Medical Progress Note      NAME: Kanchan Goldsmith   :  1940  MRM:  [id number]    Date/Time: 2017  7:32 PM       Assessment and Plan:     Acute DVT (deep venous thrombosis) ([address] (2017). Low risk, provoked distal DVT with very low risk of embolization that does not warrant the risk of full anticoagulation in setting of recent spinal surgery. Ambulation is important and should not be discouraged. This likely does not require follow-up as his sx have resolved but he can undergo repeat venous duplex in a month to ensure resolution. Vascular surgery consulted. Peripheral artery disease. Evidence of intermittent claudication. Vascular following. Awaiting ABIs.     HTN (hypertension) (2017). Continue benazepril and hydalazine PRN.      Renal insufficiency (2017). Continue hydration and monitor renal function      Lumbar stenosis with neurogenic claudication (2017). Management per orthopedics.           Subjective:     Chief Complaint:  Patient seen and examined. Chart reviewed. Patient reports his sx have resolved completely and he is in no pain. He reports no chest pain or SOB    ROS:  (bold if positive, if negative)      Tolerating PT  Tolerating Diet        Objective:     Last 24hrs VS reviewed since prior progress note.  Most recent are:    Visit Vitals    /75 (BP 1 Location: Right arm, BP Patient Position: At rest)    Pulse 96    Temp 97.8 °F (36.6 °C)    Resp 16    Ht 6' (1.829 m)    Wt 84.5 kg (186 lb 4.6 oz)    SpO2 96%    BMI 25.27 kg/m2     SpO2 Readings from Last 6 Encounters:   17 96%   17 96%    O2 Flow Rate (L/min): 3 l/min     Intake/Output Summary (Last 24 hours) at 17  Last data filed at 17 1713   Gross per 24 hour   Intake              200 ml   Output             1860 ml   Net            -1660 ml        Physical Exam:    Gen: Well-developed, well-nourished, in no acute distress  HEENT:  Pink conjunctivae, PERRL, hearing intact to voice, moist mucous membranes  Neck:  Supple, without masses, thyroid non-tender  Resp:  No accessory muscle use, clear breath sounds without wheezes rales or rhonchi  Card:  No murmurs, normal S1, S2 without thrills, bruits or peripheral edema  Abd:  Soft, non-tender, non-distended, normoactive bowel sounds are present, no palpable organomegaly and no detectable hernias  Lymph:  No cervical or inguinal adenopathy  Musc:  No cyanosis or clubbing  Skin:  No rashes or ulcers, skin turgor is good  Neuro:  Cranial nerves are grossly intact, no focal motor weakness, follows commands appropriately  Psych:  Good insight, oriented to person, place and time, alert    __________________________________________________________________  Medications Reviewed: (see below)  Medications:     Current Facility-Administered Medications   Medication Dose Route Frequency    lactase (LACTAID) tablet 6,000 Units  6,000 Units Oral QID PRN    HYDROmorphone (PF) (DILAUDID) injection 1 mg  1 mg IntraVENous Q4H PRN    oxyCODONE-acetaminophen (PERCOCET 10)  mg per tablet 1 Tab  1 Tab Oral Q4H PRN    oxyCODONE-acetaminophen (PERCOCET) 5-325 mg per tablet 1 Tab  1 Tab Oral Q4H PRN    benazepril (LOTENSIN) tablet 40 mg  40 mg Oral DAILY    bisoprolol-hydroCHLOROthiazide (ZIAC) 10-6.25 mg per tablet 1 Tab  1 Tab Oral DAILY    famotidine (PEPCID) tablet 20 mg  20 mg Oral BID    0.9% sodium chloride infusion  75 mL/hr IntraVENous CONTINUOUS    sodium chloride (NS) flush 5-10 mL  5-10 mL IntraVENous Q8H    sodium chloride (NS) flush 5-10 mL  5-10 mL IntraVENous PRN    diazePAM (VALIUM) tablet 5 mg  5 mg Oral Q6H PRN    ceFAZolin in 0.9% NS (ANCEF) IVPB soln 2 g  2 g IntraVENous Q8H    acetaminophen (TYLENOL) tablet 650 mg  650 mg Oral Q4H PRN    naloxone (NARCAN) injection 0.4 mg  0.4 mg IntraVENous PRN    ondansetron [facility]) injection 4 mg  4 mg IntraVENous Q4H PRN    diphenhydrAMINE (BENADRYL) capsule 25 mg  25 mg Oral Q4H PRN    docusate sodium (COLACE) capsule 100 mg  100 mg Oral BID    cholecalciferol (VITAMIN D3) tablet 1,000 Units  1,000 Units Oral DAILY    hydrALAZINE (APRESOLINE) 20 mg/mL injection 10 mg  10 mg IntraVENous Q2H PRN        Lab Data Reviewed: (see below)  Lab Review:     Recent Labs      08/22/17   0121   WBC  11.1   HGB  13.7   HCT  42.7   PLT  191     Recent Labs      08/22/17   0121   NA  140   K  5.3*   CL  105   CO2  29   GLU  151*   BUN  28*   CREA  1.90*   CA  8.6     No results found for: GLUCPOC  No results for input(s): PH, PCO2, PO2, HCO3, FIO2 in the last 72 hours. No results for input(s): INR in the last 72 hours. No lab exists for component: INREXT  [facility] Results     None          I have reviewed notes of prior 24hr.     Other pertinent lab: None    Total time spent with patient: 30 5 [address] discussed with: Patient, Family, Nursing Staff and Consultant/Specialist    Discussed:  Care Plan    Prophylaxis:  Lovenox    Disposition:  Home w/Family           ___________________________________________________    Attending Physician: Alesha Mott DO

## 2017-08-22 NOTE — PROGRESS NOTES
Unable to palpate bilateral pedal pulses and heard nothing on dopler. Dangled patient on side of bed and still heard nothing on dopler. Called Dr. Marita Kaminski, who has come up to assess patient.

## 2017-08-22 NOTE — PROCEDURES
[facility]  *** FINAL REPORT ***    Name: Anabell King  MRN: [id number]    Inpatient  : 18 Oct 1940  HIS Order #: [id number]  [id number] [facility] Visit #: [id number]  Date: 22 Aug 2017    TYPE OF TEST: Peripheral Arterial Testing    REASON FOR TEST  Pulseless    Right Leg  Segmentals: Abnormal                     mmHg  Brachial         146  High thigh  Low thigh  Calf  Posterior tibial  47  Dorsalis pedis    39  Peroneal  Metatarsal  Toe pressure  Doppler:    Abnormal  PVR:        Abnormal  Ankle/Brachial: 0.32    Left Leg  Segmentals: Abnormal                     mmHg  Brachial         142  High thigh  Low thigh  Calf  Posterior tibial  35  Dorsalis pedis     0  Peroneal  Metatarsal  Toe pressure  Doppler:    Abnormal  PVR:        Abnormal  Ankle/Brachial: 0.24    INTERPRETATION/FINDINGS  PROCEDURE:  Evaluation of lower extremity arteries with systolic blood   pressure measurement at the ankle and brachial level and calculation  of the ankle/brachial pressure index (SAURABH). The exam may also include   pulse volume recording (PVR) plethysmography at the ankle level. CONCLUSION:  1. Severe peripheral arterial disease indicated at rest in the right  leg. 2. Critical peripheral arterial disease indicated at rest in the left  leg. 3. The right ankle/brachial index is 0.32 and the left ankle/brachial  index is 0.24.  4. No flow detected in bilateral great toes. ADDITIONAL COMMENTS    I have personally reviewed the data relevant to the interpretation of  this  study. TECHNOLOGIST: JERRY Monique  Signed: 2017 04:05 PM    PHYSICIAN: Dino Richards.  Paris Farley MD  Signed: 2017 12:10 PM

## 2017-08-22 NOTE — PROGRESS NOTES
Called by the nurse to check the pt as she couldn't palpate or hear the pulse on pedal artery on the LT. I came to evaluate pt the pt and no palpable pulse or couldn't hear using doppler on dorsalis pedis and post tibial artery BL. Pt foot is cold, but no discoloration. Pt is c/o of unable dorsiflex the LT foot and pain, which is new. Checked and femoral artery pulses which are intact. Plan: consult vascular surgery.

## 2017-08-22 NOTE — PROGRESS NOTES
Verified vascular orders with Dr. Laila Barcenas. Agreed to change PVR order to SAURABH's, given contraindications.

## 2017-08-22 NOTE — PROGRESS NOTES
ORTHOPAEDIC LUMBAR FUSION PROGRESS NOTE    NAME:     Marcelino Dawn   :       1940   MRN:       [id number]   DATE:      2017    POD:    1 Day Post-Op  S/P:    Procedure(s):  L3-L5 LAMINECTOMY, FUSION, INSTRUMENTATION, TLIF, BONE GRAFT    SUBJECTIVE:    C/O Left calf pain. Pt states that this is the only pain he has. His pain is exacerbated by dorsiflexion of the ankle and palpation of the calf  No leg numbness  Denies nausea/vomiting, chest pain, headache or shortness of breath  Pain controlled    Renal insufficiency was present on admission  Pt has K+ of 5.3 on AM labs, repeat potassium ordered    Recent Labs      17   0121   HGB  13.7   HCT  42.7   NA  140   K  5.3*   CL  105   CO2  29   BUN  28*   CREA  1.90*   GLU  151*     Patient Vitals for the past 12 hrs:   BP Temp Pulse Resp SpO2   17 0338 149/84 98.3 °F (36.8 °C) 96 17 99 %   17 0057 145/63 98.1 °F (36.7 °C) 93 16 98 %   17 2207 (!) 162/93 97.4 °F (36.3 °C) 91 16 98 %   17 2130 (!) 171/96 - 97 19 96 %   17 2115 169/89 - 99 8 98 %   17 2100 144/82 98.4 °F (36.9 °C) 96 8 96 %   17 204 149/86 - 97 11 96 %   17 (!) 158/91 - 99 9 (!) 89 %   17 188/79 - 95 20 97 %   17 173/82 - 100 15 97 %   17 194 150/89 - 96 14 97 %   17 193 144/88 - 98 9 98 %   17 191 158/79 - 94 10 97 %   17 190 163/79 - 99 10 94 %   17 1900 160/89 - (!) 101 11 (!) 89 %   17 185 (!) 176/91 - (!) 102 15 98 %   17 (!) 169/91 - (!) 102 13 99 %   17 (!) 166/116 - (!) 102 14 99 %   17 157/78 98.7 °F (37.1 °C) (!) 105 14 96 %   17 (!) 169/91 - 100 14 99 %   17 164/83 - - - -       EXAM:  Dressings clean, dry and intact   Positive strength/ROM bilat lower ext. No L foot drop. Pt has 5/5 strength w/ L dorsiflexion but this does exacerbate his lower leg pain.  No foot drop  Neuro intact to sensation  L calf is tender to palpation. No swelling, erythema or increased warmth. R Calf is soft & nontender  Bilateral feet cool to the touch. Equal temp. Skin is pink. No cyanosis  Pulses not heard w/ doppler.  Bilateral DP and PT pulses are faintly palpable intermittently      PLAN:  Vascular surgery to see patient today  Hospitalist following  Will discuss anti-coagulation w/ Dr. Vicky Elizondo this AM  D/C PCA, change to PO pain medications  PT/OT, OOB w/ assistance  Advance regular diet      Babar Amanda [place]  Orthopaedic Surgery  Physician Assistant to Dr. Marcello Sacks

## 2017-08-22 NOTE — PROGRESS NOTES
Occupational Therapy    Orders received and chart reviewed. Note patient with L LE acute DVT. Patient currently not on anticoagulation. Will hold therapy until POC per chart. Will continue to follow.      Thank you,    BRADLEY Craig/EUNICE

## 2017-08-22 NOTE — PROGRESS NOTES
Bedside and Verbal shift change report given to [address] (oncoming nurse) by Colleen Mckenzie RN (offgoing nurse). Report included the following information SBAR, Kardex, Procedure Summary, Intake/Output, MAR and Recent Results.

## 2017-08-22 NOTE — PROGRESS NOTES
Primary Nurse Tanya Vergara RN and LB dick performed a dual skin assessment on this patient No impairment noted  Landen score is 21

## 2017-08-22 NOTE — PROGRESS NOTES
Pt having severe L calf pain while in PACU. + tenderness to posterior calf upon RN's exam. No swelling or erythema. Pt did not have any LE swelling, erythema or increased warmth pre-op. RN spoke w/ on-call RAQUEL Morrissey. Left LE duplex venous US was ordered. US was + for posterior tibial DVT. Pt is POD 0    Hospitalist consult placed to determine definite need for anti-coagulation.    Will discuss w/ Dr. Phillips  re: possible anti-coagulation  Suspect there may also be a radiculopathy component to his pain, 6 mg of IV Decadron (1 time dose) ordered    Marcelino Davenport PA-C  Orthopaedic Spine Surgery  Physician Assistant to Dr. Jacqueline Moreno

## 2017-08-22 NOTE — ANESTHESIA POSTPROCEDURE EVALUATION
Post-Anesthesia Evaluation and Assessment    Patient: Keegan Verma MRN: [id number]  SSN: [id number]    YOB: 1940  Age: 78 y.o. Sex: male       Cardiovascular Function/Vital Signs  Visit Vitals    /79    Pulse 99    Temp 37.1 °C (98.7 °F)    Resp 10    Ht 6' (1.829 m)    Wt 84.5 kg (186 lb 4.6 oz)    SpO2 94%    BMI 25.27 kg/m2       Patient is status post general anesthesia for Procedure(s):  L3-L5 LAMINECTOMY, FUSION, INSTRUMENTATION, TLIF, BONE GRAFT. Nausea/Vomiting: None    Postoperative hydration reviewed and adequate. Pain:  Pain Scale 1: Visual (08/21/17 1855)  Pain Intensity 1: 8 (08/21/17 1855)   Managed    Neurological Status:   Neuro (WDL): Within Defined Limits (08/21/17 1846)   At baseline    Mental Status and Level of Consciousness: Arousable    Pulmonary Status:   O2 Device: Oxygen mask (08/21/17 1846)   Adequate oxygenation and airway patent    Complications related to anesthesia: None    Post-anesthesia assessment completed.  No concerns    Signed By: Talia Garcia MD     August 21, 2017

## 2017-08-22 NOTE — PROGRESS NOTES
Called by the nurse to check the pt as she couldn't palpate or hear the pulse on pedal artery on the LT. I came to evaluate pt the pt and no palpable pulse or couldn't hear using doppler on dorsalis pedis and post tibial artery BL. Pt foot is cold, but no discoloration. Pt is c/o of unable dorsiflex the LT foot and pain, which is new. Checked and femoral artery pulses which are intact. Plan: check arterial duplex BL. Consult vascular surgery.

## 2017-08-23 LAB
ANION GAP SERPL CALC-SCNC: 2 MMOL/L (ref 5–15)
BUN SERPL-MCNC: 25 MG/DL (ref 6–20)
BUN/CREAT SERPL: 16 (ref 12–20)
CALCIUM SERPL-MCNC: 7.7 MG/DL (ref 8.5–10.1)
CHLORIDE SERPL-SCNC: 109 MMOL/L (ref 97–108)
CO2 SERPL-SCNC: 31 MMOL/L (ref 21–32)
CREAT SERPL-MCNC: 1.55 MG/DL (ref 0.7–1.3)
ERYTHROCYTE [DISTWIDTH] IN BLOOD BY AUTOMATED COUNT: 13.7 % (ref 11.5–14.5)
GLUCOSE SERPL-MCNC: 122 MG/DL (ref 65–100)
HCT VFR BLD AUTO: 33.7 % (ref 36.6–50.3)
HGB BLD-MCNC: 10.9 G/DL (ref 12.1–17)
MAGNESIUM SERPL-MCNC: 1.9 MG/DL (ref 1.6–2.4)
MCH RBC QN AUTO: 30.6 PG (ref 26–34)
MCHC RBC AUTO-ENTMCNC: 32.3 G/DL (ref 30–36.5)
MCV RBC AUTO: 94.7 FL (ref 80–99)
PHOSPHATE SERPL-MCNC: 2.6 MG/DL (ref 2.6–4.7)
PLATELET # BLD AUTO: 153 K/UL (ref 150–400)
POTASSIUM SERPL-SCNC: 4.4 MMOL/L (ref 3.5–5.1)
RBC # BLD AUTO: 3.56 M/UL (ref 4.1–5.7)
SODIUM SERPL-SCNC: 142 MMOL/L (ref 136–145)
WBC # BLD AUTO: 9.5 K/UL (ref 4.1–11.1)

## 2017-08-23 PROCEDURE — 97165 OT EVAL LOW COMPLEX 30 MIN: CPT

## 2017-08-23 PROCEDURE — 97110 THERAPEUTIC EXERCISES: CPT

## 2017-08-23 PROCEDURE — 97116 GAIT TRAINING THERAPY: CPT

## 2017-08-23 PROCEDURE — 74011250636 HC RX REV CODE- 250/636: Performed by: INTERNAL MEDICINE

## 2017-08-23 PROCEDURE — 74011250637 HC RX REV CODE- 250/637: Performed by: ORTHOPAEDIC SURGERY

## 2017-08-23 PROCEDURE — 80048 BASIC METABOLIC PNL TOTAL CA: CPT | Performed by: INTERNAL MEDICINE

## 2017-08-23 PROCEDURE — 97535 SELF CARE MNGMENT TRAINING: CPT

## 2017-08-23 PROCEDURE — 74011250637 HC RX REV CODE- 250/637: Performed by: NURSE PRACTITIONER

## 2017-08-23 PROCEDURE — 84100 ASSAY OF PHOSPHORUS: CPT | Performed by: INTERNAL MEDICINE

## 2017-08-23 PROCEDURE — 36415 COLL VENOUS BLD VENIPUNCTURE: CPT | Performed by: INTERNAL MEDICINE

## 2017-08-23 PROCEDURE — 74011250636 HC RX REV CODE- 250/636: Performed by: ORTHOPAEDIC SURGERY

## 2017-08-23 PROCEDURE — 85027 COMPLETE CBC AUTOMATED: CPT | Performed by: INTERNAL MEDICINE

## 2017-08-23 PROCEDURE — 83735 ASSAY OF MAGNESIUM: CPT | Performed by: INTERNAL MEDICINE

## 2017-08-23 PROCEDURE — 65270000029 HC RM PRIVATE

## 2017-08-23 RX ADMIN — BENAZEPRIL HYDROCHLORIDE 40 MG: 10 TABLET, COATED ORAL at 21:59

## 2017-08-23 RX ADMIN — Medication 10 ML: at 13:16

## 2017-08-23 RX ADMIN — VITAMIN D, TAB 1000IU (100/BT) 1000 UNITS: 25 TAB at 09:17

## 2017-08-23 RX ADMIN — DOCUSATE SODIUM 100 MG: 100 CAPSULE ORAL at 17:53

## 2017-08-23 RX ADMIN — SODIUM CHLORIDE 75 ML/HR: 900 INJECTION, SOLUTION INTRAVENOUS at 06:57

## 2017-08-23 RX ADMIN — CEFAZOLIN 2 G: 1 INJECTION, POWDER, FOR SOLUTION INTRAMUSCULAR; INTRAVENOUS; PARENTERAL at 06:57

## 2017-08-23 RX ADMIN — HYDRALAZINE HYDROCHLORIDE 10 MG: 20 INJECTION INTRAMUSCULAR; INTRAVENOUS at 05:37

## 2017-08-23 RX ADMIN — BISOPROLOL FUMARATE AND HYDROCHLOROTHIAZIDE 1 TABLET: 6.25; 1 TABLET ORAL at 09:17

## 2017-08-23 RX ADMIN — CEFAZOLIN 2 G: 1 INJECTION, POWDER, FOR SOLUTION INTRAMUSCULAR; INTRAVENOUS; PARENTERAL at 15:20

## 2017-08-23 RX ADMIN — DIAZEPAM 5 MG: 5 TABLET ORAL at 05:32

## 2017-08-23 RX ADMIN — OXYCODONE HYDROCHLORIDE AND ACETAMINOPHEN 1 TABLET: 10; 325 TABLET ORAL at 15:20

## 2017-08-23 RX ADMIN — FAMOTIDINE 20 MG: 20 TABLET, FILM COATED ORAL at 17:53

## 2017-08-23 RX ADMIN — DOCUSATE SODIUM 100 MG: 100 CAPSULE ORAL at 09:17

## 2017-08-23 RX ADMIN — OXYCODONE HYDROCHLORIDE AND ACETAMINOPHEN 1 TABLET: 10; 325 TABLET ORAL at 09:17

## 2017-08-23 RX ADMIN — Medication 10 ML: at 22:00

## 2017-08-23 RX ADMIN — FAMOTIDINE 20 MG: 20 TABLET, FILM COATED ORAL at 09:17

## 2017-08-23 RX ADMIN — OXYCODONE HYDROCHLORIDE AND ACETAMINOPHEN 1 TABLET: 10; 325 TABLET ORAL at 20:15

## 2017-08-23 RX ADMIN — DIAZEPAM 5 MG: 5 TABLET ORAL at 21:59

## 2017-08-23 RX ADMIN — Medication 10 ML: at 06:57

## 2017-08-23 NOTE — PROGRESS NOTES
Problem: Self Care Deficits Care Plan (Adult)  Goal: *Acute Goals and Plan of Care (Insert Text)  Occupational Therapy Goals  Initiated 8/23/2017    1. Patient will perform grooming with supervision/set-up standing at sink within 7 days. 2. Patient will perform lower body dressing with modified independence using most appropriate AE within 7 days. 3. Patient will toileting at modified independence within 7 days. 4. Patient will perform toilet transfer with modified independence from RW to raised toilet seat within 7 days. 5. Patient will don/doff back brace at independence within 7 days. 6. Patient will verbalize/demonstrate 3/3 back precautions during ADL tasks without cues within 7 days. OCCUPATIONAL THERAPY EVALUATION  Patient: Kaylen Javed (15 y.o. male)  Date: 8/23/2017  Primary Diagnosis: Bilateral stenosis of lateral recess of lumbar spine [M48.06]  Procedure(s) (LRB):  L3-L5 LAMINECTOMY, FUSION, INSTRUMENTATION, TLIF, BONE GRAFT (N/A) 2 Days Post-Op   Precautions:  Fall, Spinal      ASSESSMENT :  Cleared by RN to see pt for therapy session. Pt with positive doppler of LLE, but cleared by MD to participate in therapy session. Based on the objective data described below, the patient presents with decreased balance, endurance, and trunk ROM following admission for bilateral lumbar spinal stenosis. Pt is POD 2 L3-L5 laminectomy. Pt received seated in chair with brace donned, pleasant and agreeable to participate. Pt is previously I with ADLs and lives with his wife. He was able to recall 3/3 back precautions without cueing. Pt's L shoulder flexion ROM limited to approximately 90 degrees due to arthritis, and L shoulder strength is 3/5. However pt's UE strength and ROM functional, and he is R handed. Pt performed seated grooming tasks with setup.  He stood with RW and mod A x2 to use urinal (min A to manage clothing), then ambulated to bathroom with RW and min A x2 to perform toilet transfer to raised seat with min A. Pt returned to chair with min A x2, call bell within reach. Discussed with pt how his spinal precautions impact LB dressing and toileting tasks, and pt agreeable to education on LB AE tomorrow. He reports that his wife will be available 24/7 to assist him as needed at home. Pt will benefit from continued OT to increase independence with ADLs and functional transfers, including training on donning/doffing brace. Recommend HHOT upon discharge. Patient will benefit from skilled intervention to address the above impairments. Patients rehabilitation potential is considered to be Good  Factors which may influence rehabilitation potential include:   [X]             None noted  [ ]             Mental ability/status  [ ]             Medical condition  [ ]             Home/family situation and support systems  [ ]             Safety awareness  [ ]             Pain tolerance/management  [ ]             Other:        PLAN :  Recommendations and Planned Interventions:  [X]               Self Care Training                  [X]        Therapeutic Activities  [X]               Functional Mobility Training    [ ]        Cognitive Retraining  [X]               Therapeutic Exercises           [X]        Endurance Activities  [X]               Balance Training                   [ ]        Neuromuscular Re-Education  [ ]               Visual/Perceptual Training     [X]   Home Safety Training  [X]               Patient Education                 [X]        Family Training/Education  [ ]               Other (comment):     Frequency/Duration: Patient will be followed by occupational therapy 5 times a week to address goals. Discharge Recommendations: Home Health  Further Equipment Recommendations for Discharge: LB adaptive equipment and shower chair       SUBJECTIVE:   Patient stated I'm getting around a lot better today.       OBJECTIVE DATA SUMMARY:   HISTORY:   Past Medical History:   Diagnosis Date    Adverse effect of anesthesia       slow to wake up    Arthritis      Cancer [facility])       prostate    Chronic pain       back pain    Hypertension      Ill-defined condition       perpherial vascular disease     Past Surgical History:   Procedure Laterality Date    HX HEENT         tonsils as child    HX ORTHOPAEDIC Bilateral 2003     knee replacement    HX PROSTATECTOMY   2012        Prior Level of Function/Home Situation:  Pt is previously I with ADLs and lives with his wife. Expanded or extensive additional review of patient history:      Home Situation  Home Environment: Private residence  # Steps to Enter: 3  Rails to Enter: Yes  Hand Rails : Bilateral  One/Two Story Residence: One story  Living Alone: No (lives with wife)  Support Systems: Spouse/Significant Other/Partner, Family member(s)  Patient Expects to be Discharged to[de-identified] Private residence  Current DME Used/Available at Home: [employer], straight, Walker, Crutches, Raised toilet seat  Tub or Shower Type: Tub/Shower combination  [X]  Right hand dominant             [ ]  Left hand dominant     EXAMINATION OF PERFORMANCE DEFICITS:  Cognitive/Behavioral Status:  Neurologic State: Alert  Orientation Level: Oriented X4  Cognition: Follows commands  Perception: Appears intact  Perseveration: No perseveration noted  Safety/Judgement: Awareness of environment; Fall prevention;Home safety     Hearing: Auditory  Auditory Impairment: None  Hearing Aids/Status: Does not own     Vision/Perceptual:            Acuity: Able to read clock/calendar on wall without difficulty; Able to read employee name badge without difficulty    Corrective Lenses: Glasses     Range of Motion:  AROM: Generally decreased, functional (Pt with L shoulder arthritis limiting shoulder flexion)  PROM: Within functional limits        Strength:  Strength: Generally decreased, functional   Pt's L shoulder flexion ROM limited to approximately 90 degrees due to arthritis, and L shoulder strength is 3/5. Coordination:  Coordination: Within functional limits  Fine Motor Skills-Upper: Left Intact; Right Intact    Gross Motor Skills-Upper: Left Impaired;Right Intact     Tone & Sensation:  Tone: Normal  Sensation: Intact        Balance:  Sitting: Intact; With support  Standing - Static: Constant support; Fair  Standing - Dynamic : Fair (slight unsteady with turning but w/o buckling today)     Functional Mobility and Transfers for ADLs:  Bed Mobility:  Pt received in chair and returned to chair at end of session. Transfers:  Sit to Stand: Moderate assistance; Additional time;Assist x2  Stand to Sit: Minimum assistance; Additional time;Assist x2  Bed to Chair: Minimum assistance;Assist x2 (w/o buckling)  Toilet Transfer : Minimum assistance;Assist x2     ADL Assessment:  Feeding: Independent     Oral Facial Hygiene/Grooming: Setup     Bathing: Moderate assistance     Upper Body Dressing: Minimum assistance     Lower Body Dressing: Maximum assistance     Toileting: Moderate assistance           ADL Intervention and task modifications:   Pt performed seated grooming ADLs with setup, and stood with min A x2 to use urinal (min A for clothing management)     Grooming  Grooming Assistance: Supervision/set up (in sitting)  Washing Face: Supervision/set-up  Brushing Teeth: Supervision/set-up           Lower Body Dressing Assistance  Socks: Total assistance (dependent)     Toileting  Toileting Assistance: Minimum assistance (in standing with urinal)  Bladder Hygiene: Minimum assistance     Cognitive Retraining  Safety/Judgement: Awareness of environment; Fall prevention;Home safety     Functional Measure:  Barthel Index:      Bathin  Bladder: 10  Bowels: 10  Groomin  Dressin  Feeding: 10  Mobility: 5  Stairs: 0  Toilet Use: 5  Transfer (Bed to Chair and Back): 5  Total: 55         Barthel and G-code impairment scale:  Percentage of impairment CH  0% CI  1-19% CJ  20-39% CK  40-59% CL  60-79% CM  80-99% CN  100% Barthel Score 0-100 100 99-80 79-60 59-40 20-39 1-19    0   Barthel Score 0-20 20 17-19 13-16 9-12 5-8 1-4 0      The Barthel ADL Index: Guidelines  1. The index should be used as a record of what a patient does, not as a record of what a patient could do. 2. The main aim is to establish degree of independence from any help, physical or verbal, however minor and for whatever reason. 3. The need for supervision renders the patient not independent. 4. A patient's performance should be established using the best available evidence. Asking the patient, friends/relatives and nurses are the usual sources, but direct observation and common sense are also important. However direct testing is not needed. 5. Usually the patient's performance over the preceding 24-48 hours is important, but occasionally longer periods will be relevant. 6. Middle categories imply that the patient supplies over 50 per cent of the effort. 7. Use of aids to be independent is allowed. Mesha Higuera., Barthel, D.W. (2122). Functional evaluation: the Barthel Index. [address] [facility] (14)2. CATA Law, Judie Saini., Patricia Fontanez., [place], [address] [facility] (1999). Measuring the change indisability after inpatient rehabilitation; comparison of the responsiveness of the Barthel Index and Functional [place] Measure. Journal of Neurology, Neurosurgery, and Psychiatry, 66(4), 344-596. Heather Garrett, N.J.A, [place] Nick.OSCAR, & Tigre Leung M.A. (2004.) Assessment of post-stroke quality of life in cost-effectiveness studies: The usefulness of the Barthel Index and the EuroQoL-5D. Quality of Life Research, 13, 050-87         G codes: In compliance with CMSs Claims Based Outcome Reporting, the following G-code set was chosen for this patient based on their primary functional limitation being treated:      The outcome measure chosen to determine the severity of the functional limitation was the Barthel Index with a score of 55/100 which was correlated with the impairment scale. · Self Care:               - CURRENT STATUS:    CK - 40%-59% impaired, limited or restricted               - GOAL STATUS:           CI - 1%-19% impaired, limited or restricted               - D/C STATUS:                       ---------------To be determined---------------      Occupational Therapy Evaluation Charge Determination   History Examination Decision-Making   LOW Complexity : Brief history review  LOW Complexity : 1-3 performance deficits relating to physical, cognitive , or psychosocial skils that result in activity limitations and / or participation restrictions  LOW Complexity : No comorbidities that affect functional and no verbal or physical assistance needed to complete eval tasks       Based on the above components, the patient evaluation is determined to be of the following complexity level: LOW   Pain:  Pain Scale 1: Numeric (0 - 10)  Pain Intensity 1: 0  Pain Location 1: Back  Pain Orientation 1: Posterior  Pain Description 1: Aching  Pain Intervention(s) 1: Medication (see MAR)  Activity Tolerance:   Good  Please refer to the flowsheet for vital signs taken during this treatment. After treatment:   [X] Patient left in no apparent distress sitting up in chair  [ ] Patient left in no apparent distress in bed  [X] Call bell left within reach  [X] Nursing notified  [ ] Caregiver present  [ ] Bed alarm activated      COMMUNICATION/EDUCATION:   The patients plan of care was discussed with: Physical Therapist and Registered Nurse.  [X] Home safety education was provided and the patient/caregiver indicated understanding. [X] Patient/family have participated as able in goal setting and plan of care. [X] Patient/family agree to work toward stated goals and plan of care. [ ] Patient understands intent and goals of therapy, but is neutral about his/her participation.   [ ] Patient is unable to participate in goal setting and plan of care.  This patients plan of care is appropriate for delegation to [facility].      Thank you for this referral.  DEV Lamar  Time Calculation: 27 mins

## 2017-08-23 NOTE — PROGRESS NOTES
Kermit Rodriguez [facility] [place] [address]  [address] [facility], [address] [place] [address]  [phone]      Medical Progress Note      NAME: Jessica Rust   :  1940  MRM:  [id number]    Date/Time: 2017         Subjective:     Chief Complaint:  Patient was seen and examined by me. Chart reviewed. C/o intermittent left lower leg pain       Objective:       Vitals:       Last 24hrs VS reviewed since prior progress note.  Most recent are:    Visit Vitals    /77 (BP 1 Location: Right arm, BP Patient Position: At rest)    Pulse 98    Temp 97.7 °F (36.5 °C)    Resp 16    Ht 6' (1.829 m)    Wt 84.5 kg (186 lb 4.6 oz)    SpO2 98%    BMI 25.27 kg/m2     SpO2 Readings from Last 6 Encounters:   17 98%   17 96%    O2 Flow Rate (L/min): 3 l/min     Intake/Output Summary (Last 24 hours) at 17 1453  Last data filed at 17 1319   Gross per 24 hour   Intake           4092.5 ml   Output             3110 ml   Net            982.5 ml        Exam:     Physical Exam:    Gen:  Well-developed, well-nourished, obese, in no acute distress  HEENT:  Pink conjunctivae, PERRL, hearing intact to voice, moist mucous membranes  Neck:  Supple, without masses, thyroid non-tender  Resp:  No accessory muscle use, clear breath sounds without wheezes rales or rhonchi  Card:  No murmurs, normal S1, S2 without thrills, bruits or peripheral edema  Abd:  Soft, non-tender, non-distended, normoactive bowel sounds are present  Musc:  No cyanosis or clubbing, poor distal pulses  Skin:  No rashes   Neuro:  Cranial nerves 3-12 are grossly intact, follows commands appropriately  Psych:  Good insight, oriented to person, place and time, alert    Medications Reviewed: (see below)    Lab Data Reviewed: (see below)    ______________________________________________________________________    Medications:     Current Facility-Administered Medications   Medication Dose Route Frequency    lactase (LACTAID) tablet 6,000 Units 6,000 Units Oral QID PRN    HYDROmorphone (PF) (DILAUDID) injection 1 mg  1 mg IntraVENous Q4H PRN    oxyCODONE-acetaminophen (PERCOCET 10)  mg per tablet 1 Tab  1 Tab Oral Q4H PRN    oxyCODONE-acetaminophen (PERCOCET) 5-325 mg per tablet 1 Tab  1 Tab Oral Q4H PRN    benazepril (LOTENSIN) tablet 40 mg  40 mg Oral DAILY    bisoprolol-hydroCHLOROthiazide (ZIAC) 10-6.25 mg per tablet 1 Tab  1 Tab Oral DAILY    famotidine (PEPCID) tablet 20 mg  20 mg Oral BID    0.9% sodium chloride infusion  75 mL/hr IntraVENous CONTINUOUS    sodium chloride (NS) flush 5-10 mL  5-10 mL IntraVENous Q8H    sodium chloride (NS) flush 5-10 mL  5-10 mL IntraVENous PRN    diazePAM (VALIUM) tablet 5 mg  5 mg Oral Q6H PRN    ceFAZolin in 0.9% NS (ANCEF) IVPB soln 2 g  2 g IntraVENous Q8H    acetaminophen (TYLENOL) tablet 650 mg  650 mg Oral Q4H PRN    naloxone (NARCAN) injection 0.4 mg  0.4 mg IntraVENous PRN    ondansetron (ZOFRAN) injection 4 mg  4 mg IntraVENous Q4H PRN    diphenhydrAMINE (BENADRYL) capsule 25 mg  25 mg Oral Q4H PRN    docusate sodium (COLACE) capsule 100 mg  100 mg Oral BID    cholecalciferol (VITAMIN D3) tablet 1,000 Units  1,000 Units Oral DAILY    hydrALAZINE (APRESOLINE) 20 mg/mL injection 10 mg  10 mg IntraVENous Q2H PRN          Lab Review:     Recent Labs      08/23/17 0233 08/22/17   0121   WBC  9.5  11.1   HGB  10.9*  13.7   HCT  33.7*  42.7   PLT  153  191     Recent Labs      08/23/17   0233  08/22/17   2135  08/22/17   0121   NA  142   --   140   K  4.4  5.3*  5.3*   CL  109*   --   105   CO2  31   --   29   GLU  122*   --   151*   BUN  25*   --   28*   CREA  1.55*   --   1.90*   CA  7.7*   --   8.6   MG  1.9   --    --    PHOS  2.6   --    --      No results found for: GLUCPOC       Assessment / Plan: Active Problems:    77 yo hx of HTN, prostate CA, lumbar stenosis s/p lumbar surg.   Medicine is following as a consultant    1) Lumbar stenosis with neurogenic claudication: s/p surg.  Management of DVT prophy, rehab, pain control, per Ortho    2) Acute distal DVT (deep venous thrombosis) of L calf: unable to start anticoagulation due to recent lumbar surg. Low risk for embolization. Would repeat LE dopplers in 1 month to ensure resolution    3) PAD/intermittent BLE rest pain: SAURABH of right is 0.32 and left is 0.24. Defer to vascular surg    4) HTN: cont benazepril.   Use hydralazine prn    5) CKD: will monitor BMP    Total time spent with patient: 30 300 Ariel Carbajal discussed with: Patient, nursing    Discussed:  Care Plan    Prophylaxis:  per team    Disposition:  per team           ___________________________________________________    Attending Physician: Dwight Brown MD

## 2017-08-23 NOTE — PROGRESS NOTES
Problem: Mobility Impaired (Adult and Pediatric)  Goal: *Acute Goals and Plan of Care (Insert Text)  Physical Therapy Goals  Initiated 8/22/2017  1. Patient will move from supine to sit and sit to supine , scoot up and down and roll side to side in bed with modified independence within 7 day(s). 2. Patient will transfer from bed to chair and chair to bed with supervision/set-up using the least restrictive device within 7 day(s). 3. Patient will perform sit <> stand with minimal assistance/contact guard assist within 7 day(s). 4. Patient will ambulate with minimal assistance/contact guard assist for 50 feet with the least restrictive device within 7 day(s). 5. Patient will ascend/descend 3 stairs with 2 handrail(s) with minimal assistance/contact guard assist within 7 day(s). PHYSICAL THERAPY TREATMENT  Patient: Nagi Ambrosio (89 y.o. male)  Date: 8/23/2017  Precautions: Fall, Spinal      ASSESSMENT:  Much improved gait stability and balance both tx today. Able to tolerate BID tx and amb again 125' with RW and without loss of balance or knee instability. Again emphasized fall prevention. Patient reports he has been debilitated with spine and LE weakness x 14 months. He is motivated to be diligent working on rehab to recover strength and functional abilities of greater independence. He is anticipating discharge home tomorrow. Home with HHPT or outpatient - per Dr. Kiara Rosa POC  Progression toward goals:  [X]      Improving appropriately and progressing toward goals  [ ]      Improving slowly and progressing toward goals  [ ]      Not making progress toward goals and plan of care will be adjusted       PLAN:  Patient continues to benefit from skilled intervention to address the above impairments. Continue treatment per established plan of care.   Discharge Recommendations:  Home Health or outpatient  Further Equipment Recommendations for Discharge:  None new       SUBJECTIVE:   Patient stated I really don't want the wheeled walker because of the thick pile on the carpet.    The patient stated 3/3 back precautions. Reviewed all 3 with patient. OBJECTIVE DATA SUMMARY:   Functional Mobility Training:  Bed Mobility:  Log Rolling: Supervision (log roll)  Supine to Sit: Supervision     Scooting: Supervision        Brace donned with supervision  Transfers:  Sit to Stand: Moderate assistance; Additional time;Assist x2  Stand to Sit: Minimum assistance; Additional time;Assist x2  Stand Pivot Transfers: Assist x2     Bed to Chair: Minimum assistance;Assist x2 (w/o buckling)                    Ambulation/Gait Training:  Distance (ft): 100 Feet (ft)  Assistive Device: Brace/Splint;Gait belt;Walker, rolling  Ambulation - Level of Assistance: Minimal assistance; Additional time;Assist x2        Gait Abnormalities: Antalgic;Decreased step clearance; Foot drop; Step to gait (on left)           Stairs - Level of Assistance:  (NT)     Therapeutic Exercises:   Hip flexion, LAQs, ankle pumps, heelcord stretch with strap  Pain:  Pain Scale 1: Numeric (0 - 10)  Pain Intensity 1: 8  Pain Location 1: Back  Pain Orientation 1: Posterior  Pain Description 1: Aching  Pain Intervention(s) 1: Medication (see MAR)  Activity Tolerance:   Improving   Please refer to the flowsheet for vital signs taken during this treatment.   After treatment:   [ ]  Patient left in no apparent distress sitting up in chair  [X]  Patient left in no apparent distress in bed  [X]  Call bell left within reach  [X]  Nursing notified  [X]  Caregiver present  [ ]  Bed alarm activated      COMMUNICATION/COLLABORATION:   The patients plan of care was discussed with: Registered Nurse     Kedar Davison PT   Time Calculation: 15 mins

## 2017-08-23 NOTE — PROGRESS NOTES
Problem: Mobility Impaired (Adult and Pediatric)  Goal: *Acute Goals and Plan of Care (Insert Text)  Physical Therapy Goals  Initiated 8/22/2017  1. Patient will move from supine to sit and sit to supine , scoot up and down and roll side to side in bed with modified independence within 7 day(s). 2. Patient will transfer from bed to chair and chair to bed with supervision/set-up using the least restrictive device within 7 day(s). 3. Patient will perform sit <> stand with minimal assistance/contact guard assist within 7 day(s). 4. Patient will ambulate with minimal assistance/contact guard assist for 50 feet with the least restrictive device within 7 day(s). 5. Patient will ascend/descend 3 stairs with 2 handrail(s) with minimal assistance/contact guard assist within 7 day(s). PHYSICAL THERAPY TREATMENT  Patient: Joe Aldridge (86 y.o. male)  Date: 8/23/2017  Diagnosis: Bilateral stenosis of lateral recess of lumbar spine [M48.06] <principal problem not specified>  Procedure(s) (LRB):  L3-L5 LAMINECTOMY, FUSION, INSTRUMENTATION, TLIF, BONE GRAFT (N/A) 2 Days Post-Op  Precautions: Back, Fall      ASSESSMENT:  Patient 1000% better today. Patient able to ambulate 100' today with RW , step to pattern, and without buckling, slight unsteady however with turns. Patient with 1/5 left ankle DF. Patient has good recall of 3 of 4 spine precautions and demonstrated good understanding of sitting there ex as well as left ankle DF stretch in sitting and supine. Will progress BID. Today appears more likely to be able to reach goals for discharge to home with HHPT. No signs of impulsivity and verbalizes good compliance with fall prevention strategies.   Vitals stable this am- except BP elevated PRE  99% 110bpm 187/90 pain 2/10 otherwise asymptomatic of dyspnea, lightheadedness, nausea, chest pain  Progression toward goals:  [ ]      Improving appropriately and progressing toward goals  [X]      Improving slowly and progressing toward goals  [ ]      Not making progress toward goals and plan of care will be adjusted       PLAN:  Patient continues to benefit from skilled intervention to address the above impairments. Continue treatment per established plan of care. Discharge Recommendations:  Home Health  Further Equipment Recommendations for Discharge:  rolling walker       SUBJECTIVE:   Patient stated I feel 1000% better.    The patient stated 3/4 back precautions. Reviewed all 4 with patient. OBJECTIVE DATA SUMMARY:   Critical Behavior:  Neurologic State: Alert  Orientation Level: Oriented X4  Cognition: Appropriate decision making, Appropriate for age attention/concentration, Appropriate safety awareness, Follows commands  Safety/Judgement: Decreased awareness of need for safety, Decreased insight into deficits  Functional Mobility Training:  Bed Mobility:  Log Rolling: Supervision (log roll)  Supine to Sit: Supervision     Scooting: Supervision        Brace donned with  minimal assistance  Transfers:  Sit to Stand: Moderate assistance; Additional time;Assist x2  Stand to Sit: Minimum assistance; Additional time;Assist x2  Stand Pivot Transfers: Assist x2     Bed to Chair: Minimum assistance;Assist x2 (w/o buckling)                    Balance:  Standing - Static: Constant support; Fair  Standing - Dynamic : Fair (slight unsteady with turning but w/o buckling today)  Ambulation/Gait Training:  Distance (ft): 100 Feet (ft)  Assistive Device: Brace/Splint;Gait belt;Walker, rolling  Ambulation - Level of Assistance: Minimal assistance; Additional time;Assist x2        Gait Abnormalities: Antalgic;Decreased step clearance; Foot drop; Step to gait (on left)        Stairs - Level of Assistance:  (NT)     Therapeutic Exercises:   Sitting - hip flexion, LAQs, ankle pump, heel cord stretch left  Pain:  Pain Scale 1: Numeric (0 - 10)  Pain Intensity 1: 6  Pain Location 1: Back  Pain Orientation 1: Posterior  Pain Description 1: Aching  Pain Intervention(s) 1: Medication (see MAR)  Activity Tolerance:   Improved- asymptomatic chest pain, SOB, diaphoresis, lightheaded, nausea. Reports pain spine 2/10 during activity. Primary discomfort left calf stretch  Please refer to the flowsheet for vital signs taken during this treatment.   After treatment:   [X]  Patient left in no apparent distress sitting up in chair  [ ]  Patient left in no apparent distress in bed  [X]  Call bell left within reach  [X]  Nursing notified  [ ]  Caregiver present  [ ]  Bed alarm activated      COMMUNICATION/COLLABORATION:   The patients plan of care was discussed with: Registered Nurse     Cookie Wisdom PT   Time Calculation: 25 mins

## 2017-08-23 NOTE — INTERDISCIPLINARY ROUNDS
[address]    Patient Information    Name: Matthew Burgess  Age: 78 y.o. Admission Date: 8/21/2017  Surgery/Procedure: Procedure(s):  L3-L5 LAMINECTOMY, FUSION, INSTRUMENTATION, TLIF, BONE GRAFT  Attending Provider: Abilio Ferraro MD  Surgeon: Anup Cage   Problem List: Active Problems:    Lumbar stenosis with neurogenic claudication (8/21/2017)      DVT (deep venous thrombosis) ([facility] [address] (8/21/2017)      HTN (hypertension) (8/21/2017)      Renal insufficiency (8/21/2017)      During rounds the following quality care indicators and evidence based practices were addressed :       PT/OT: Patient mobility - 100' min assist  Bed Mobility Training  Rolling: Supervision (log roll)  Supine to Sit: Supervision  Scooting: Supervision  Transfer Training  Sit to Stand: Moderate assistance, Additional time, Assist x2  Stand to Sit: Minimum assistance, Additional time, Assist x2  Bed to Chair: Minimum assistance, Assist x2 (w/o buckling)      Gait Training  Assistive Device: Brace/Splint, Gait belt, Walker, rolling  Ambulation - Level of Assistance: Minimal assistance, Additional time, Assist x2  Distance (ft): 100 Feet (ft)  Stairs - Level of Assistance:  (NT)          Pain Assessment  Pain Intensity 1: 0 (08/23/17 1012)  Pain Location 1: Back  Pain Intervention(s) 1: Medication (see MAR)  Patient Stated Pain Goal: 3    Pain meds: percocet  Antibiotics completed: No, Ancef ordered, drain in place  Anticoagulation:  none  Bowel regimen: colace  Mechanical DVT prophylaxis: MICHAEL hose  Mallory: N   Goals For Today: Progress with PT, pain control    RRAT Score:      Readmit Risk Tool  Support Systems: Spouse/Significant Other/Partner, Family member(s)    Discharge Management/Planning:    Readmit Risk Assessment Information:   Low Risk            7       Total Score        2 . Living with Significant Other. Assisted Living. LTAC. SNF. or   Rehab    5 Pt.  Coverage (Medicare=5 , Medicaid, or Self-Pay=4) Criteria that do not apply:    Has Seen PCP in Last 6 Months (Yes=3, No=0)    Patient Length of Stay (>5 days = 3)    IP Visits Last 12 Months (1-3=4, 4=9, >4=11)    Charlson Comorbidity Score (Age + Comorbid Conditions)         Readmit Risk Tool  Support Systems: Spouse/Significant Other/Partner, Family member(s)    [address] [place] [address] Avenue:  Anticipated Date of Discharge: tomorrow/Friday  Barrier to Discharge (if any): n/a    Interdisciplinary team rounds were held with the following team members: Nurse Practitioner, Nursing, Pharmacy, and Rehab. See clinical pathway and/or care plan for interventions and desired outcomes.

## 2017-08-23 NOTE — PROGRESS NOTES
Bedside and Verbal shift change report given to [address] (oncoming nurse) by Franca Boston (offgoing nurse). Report included the following information SBAR, Kardex, Intake/Output, MAR and Recent Results.

## 2017-08-23 NOTE — PROGRESS NOTES
ORTHOPAEDIC LUMBAR FUSION PROGRESS NOTE    NAME:     Isabel Nichols   :       1940   MRN:       [id number]   DATE:      2017    POD:    2 Days Post-Op  S/P:    Procedure(s):  L3-L5 LAMINECTOMY, FUSION, INSTRUMENTATION, TLIF, BONE GRAFT    SUBJECTIVE:    C/O back pain along surgical incision  No leg pain or numbness  Denies nausea/vomiting, chest pain, headache or shortness of breath  Pain controlled    Recent Labs      17   0233   HGB  10.9*   HCT  33.7*   NA  142   K  4.4   CL  109*   CO2  31   BUN  25*   CREA  1.55*   GLU  122*     Patient Vitals for the past 12 hrs:   BP Temp Pulse Resp SpO2   17 1511 145/73 98.2 °F (36.8 °C) 88 16 95 %   17 1110 140/77 97.7 °F (36.5 °C) 98 16 98 %   17 0843 165/74 98.7 °F (37.1 °C) 94 15 98 %       EXAM:  Dressings clean, dry and intact   Positive strength/ROM bilat lower ext. No foot drop  Neuro intact to sensation  Bilateral calves are soft and non-tender. No edema  Bilateral feet are slightly cool to the touch. Equal temp. Skin is pink.  No cyanosis  Bilateral DP pulses are palpable     PLAN:  Hospitalist and Vascular surgery also following  Continue PO pain medications  PT/OT, OOB  Advance regular diet      Gris Par[place]  Orthopaedic Surgery  Physician Assistant to Dr. Rosie Vazquez

## 2017-08-24 VITALS
OXYGEN SATURATION: 100 % | DIASTOLIC BLOOD PRESSURE: 87 MMHG | RESPIRATION RATE: 16 BRPM | WEIGHT: 186.29 LBS | HEART RATE: 90 BPM | SYSTOLIC BLOOD PRESSURE: 150 MMHG | TEMPERATURE: 98.2 F | BODY MASS INDEX: 25.23 KG/M2 | HEIGHT: 72 IN

## 2017-08-24 LAB
ANION GAP SERPL CALC-SCNC: 4 MMOL/L (ref 5–15)
BUN SERPL-MCNC: 20 MG/DL (ref 6–20)
BUN/CREAT SERPL: 14 (ref 12–20)
CALCIUM SERPL-MCNC: 8.1 MG/DL (ref 8.5–10.1)
CHLORIDE SERPL-SCNC: 108 MMOL/L (ref 97–108)
CO2 SERPL-SCNC: 32 MMOL/L (ref 21–32)
CREAT SERPL-MCNC: 1.44 MG/DL (ref 0.7–1.3)
ERYTHROCYTE [DISTWIDTH] IN BLOOD BY AUTOMATED COUNT: 13.8 % (ref 11.5–14.5)
GLUCOSE SERPL-MCNC: 114 MG/DL (ref 65–100)
HCT VFR BLD AUTO: 34.3 % (ref 36.6–50.3)
HGB BLD-MCNC: 11.5 G/DL (ref 12.1–17)
MAGNESIUM SERPL-MCNC: 1.8 MG/DL (ref 1.6–2.4)
MCH RBC QN AUTO: 31.3 PG (ref 26–34)
MCHC RBC AUTO-ENTMCNC: 33.5 G/DL (ref 30–36.5)
MCV RBC AUTO: 93.5 FL (ref 80–99)
PHOSPHATE SERPL-MCNC: 2.9 MG/DL (ref 2.6–4.7)
PLATELET # BLD AUTO: 178 K/UL (ref 150–400)
POTASSIUM SERPL-SCNC: 4.6 MMOL/L (ref 3.5–5.1)
RBC # BLD AUTO: 3.67 M/UL (ref 4.1–5.7)
SODIUM SERPL-SCNC: 144 MMOL/L (ref 136–145)
WBC # BLD AUTO: 7.9 K/UL (ref 4.1–11.1)

## 2017-08-24 PROCEDURE — 36415 COLL VENOUS BLD VENIPUNCTURE: CPT | Performed by: INTERNAL MEDICINE

## 2017-08-24 PROCEDURE — 74011250637 HC RX REV CODE- 250/637: Performed by: ORTHOPAEDIC SURGERY

## 2017-08-24 PROCEDURE — 97530 THERAPEUTIC ACTIVITIES: CPT

## 2017-08-24 PROCEDURE — 74011250637 HC RX REV CODE- 250/637: Performed by: NURSE PRACTITIONER

## 2017-08-24 PROCEDURE — 83735 ASSAY OF MAGNESIUM: CPT | Performed by: INTERNAL MEDICINE

## 2017-08-24 PROCEDURE — 84100 ASSAY OF PHOSPHORUS: CPT | Performed by: INTERNAL MEDICINE

## 2017-08-24 PROCEDURE — 80048 BASIC METABOLIC PNL TOTAL CA: CPT | Performed by: INTERNAL MEDICINE

## 2017-08-24 PROCEDURE — 97116 GAIT TRAINING THERAPY: CPT

## 2017-08-24 PROCEDURE — 97535 SELF CARE MNGMENT TRAINING: CPT

## 2017-08-24 PROCEDURE — 85027 COMPLETE CBC AUTOMATED: CPT | Performed by: INTERNAL MEDICINE

## 2017-08-24 RX ORDER — PROMETHAZINE HYDROCHLORIDE 25 MG/1
25 TABLET ORAL
Qty: 1 TAB | Refills: 0 | Status: SHIPPED
Start: 2017-08-24

## 2017-08-24 RX ORDER — CYCLOBENZAPRINE HCL 10 MG
10 TABLET ORAL
Qty: 1 TAB | Refills: 0 | Status: SHIPPED
Start: 2017-08-24 | End: 2017-08-24

## 2017-08-24 RX ORDER — OXYCODONE AND ACETAMINOPHEN 5; 325 MG/1; MG/1
1-2 TABLET ORAL
Qty: 1 TAB | Refills: 0 | Status: SHIPPED
Start: 2017-08-24

## 2017-08-24 RX ORDER — DOCUSATE SODIUM 100 MG/1
100 CAPSULE, LIQUID FILLED ORAL 2 TIMES DAILY
Qty: 60 CAP | Refills: 2 | Status: SHIPPED
Start: 2017-08-24 | End: 2017-11-22

## 2017-08-24 RX ORDER — CYCLOBENZAPRINE HCL 5 MG
5 TABLET ORAL
Qty: 60 TAB | Refills: 1 | Status: SHIPPED
Start: 2017-08-24

## 2017-08-24 RX ADMIN — OXYCODONE HYDROCHLORIDE AND ACETAMINOPHEN 1 TABLET: 10; 325 TABLET ORAL at 03:49

## 2017-08-24 RX ADMIN — OXYCODONE HYDROCHLORIDE AND ACETAMINOPHEN 1 TABLET: 5; 325 TABLET ORAL at 12:03

## 2017-08-24 RX ADMIN — VITAMIN D, TAB 1000IU (100/BT) 1000 UNITS: 25 TAB at 10:47

## 2017-08-24 RX ADMIN — DOCUSATE SODIUM 100 MG: 100 CAPSULE ORAL at 10:47

## 2017-08-24 RX ADMIN — OXYCODONE HYDROCHLORIDE AND ACETAMINOPHEN 1 TABLET: 10; 325 TABLET ORAL at 15:20

## 2017-08-24 RX ADMIN — FAMOTIDINE 20 MG: 20 TABLET, FILM COATED ORAL at 10:47

## 2017-08-24 RX ADMIN — BISOPROLOL FUMARATE AND HYDROCHLOROTHIAZIDE 1 TABLET: 6.25; 1 TABLET ORAL at 10:47

## 2017-08-24 RX ADMIN — Medication 10 ML: at 04:45

## 2017-08-24 NOTE — INTERDISCIPLINARY ROUNDS
[address]    Patient Information    Name: Matthew Burgess  Age: 78 y.o. Admission Date: 8/21/2017  Surgery/Procedure: Procedure(s):  L3-L5 LAMINECTOMY, FUSION, INSTRUMENTATION, TLIF, BONE GRAFT  Attending Provider: Abilio Ferraro MD  Surgeon: Anup Cage   Problem List: Active Problems:    Lumbar stenosis with neurogenic claudication (8/21/2017)      DVT (deep venous thrombosis) ([facility] [address] (8/21/2017)      HTN (hypertension) (8/21/2017)      Renal insufficiency (8/21/2017)      During rounds the following quality care indicators and evidence based practices were addressed :       PT/OT: Patient mobility - Should clear this afternoon after one more session  Bed Mobility Training  Rolling: Supervision  Supine to Sit: Supervision  Scooting: Supervision  Transfer Training  Sit to Stand: Contact guard assistance  Stand to Sit: Contact guard assistance  Bed to Chair: Minimum assistance, Assist x2 (w/o buckling)      Gait Training  Assistive Device: Gait belt, Brace/Splint, Walker, rolling  Ambulation - Level of Assistance: Contact guard assistance  Distance (ft): 50 Feet (ft)  Stairs - Level of Assistance: Minimum assistance  Number of Stairs Trained: 4  Rail Use: Both          Pain Assessment  Pain Intensity 1: 0 (08/24/17 1051)  Pain Location 1: Back  Pain Intervention(s) 1: Medication (see MAR)  Patient Stated Pain Goal: 0    Pain meds: percocet  Antibiotics completed: Yes  Anticoagulation:  none    SCDs: in place  Bowels: -BM, +flatus    RRAT Score:      Readmit Risk Tool  Support Systems: Spouse/Significant Other/Partner, Family member(s)  Relationship with Primary Physician Group: Seen at least one time within past 12 months    Discharge Management/Planning:    Readmit Risk Assessment Information:   Low Risk            7       Total Score        2 . Living with Significant Other. Assisted Living. LTAC. SNF. or   Rehab    5 Pt.  Coverage (Medicare=5 , Medicaid, or Self-Pay=4) Criteria that do not apply:    Has Seen PCP in Last 6 Months (Yes=3, No=0)    Patient Length of Stay (>5 days = 3)    IP Visits Last 12 Months (1-3=4, 4=9, >4=11)    Charlson Comorbidity Score (Age + Comorbid Conditions)         Readmit Risk Tool  Support Systems: Spouse/Significant Other/Partner, Family member(s)  Relationship with Primary Physician Group: Seen at least one time within past 12 months    [facility]:     Anticipated Date of Discharge: today    Interdisciplinary team rounds were held with the following team members: Nurse Practitioner, Case Management, Nursing, Pharmacy, and Rehab. See clinical pathway and/or care plan for interventions and desired outcomes.

## 2017-08-24 NOTE — PROGRESS NOTES
Problem: Mobility Impaired (Adult and Pediatric)  Goal: *Acute Goals and Plan of Care (Insert Text)  Physical Therapy Goals  Initiated 8/22/2017  1. Patient will move from supine to sit and sit to supine , scoot up and down and roll side to side in bed with modified independence within 7 day(s). 2. Patient will transfer from bed to chair and chair to bed with supervision/set-up using the least restrictive device within 7 day(s). 3. Patient will perform sit <> stand with minimal assistance/contact guard assist within 7 day(s). 4. Patient will ambulate with minimal assistance/contact guard assist for 50 feet with the least restrictive device within 7 day(s). 5. Patient will ascend/descend 3 stairs with 2 handrail(s) with minimal assistance/contact guard assist within 7 day(s). PHYSICAL THERAPY TREATMENT  Patient: Sánchez Singh (89 y.o. male)  Date: 8/24/2017  Diagnosis: Bilateral stenosis of lateral recess of lumbar spine [M48.06] <principal problem not specified>  Procedure(s) (LRB):  L3-L5 LAMINECTOMY, FUSION, INSTRUMENTATION, TLIF, BONE GRAFT (N/A) 3 Days Post-Op  Precautions: Fall, Spinal      ASSESSMENT:  Patient received seated on edge of bed just finishing working with OT. Patient had brace already in place and able to recite all spinal precautions independently. Patient stood and ambulated 50 feet with rolling walker and went up and down 4 steps with min/CGA. Left foot drop noticeably improved today with at least 3+/5 strength. Patient anxious to go home. PT will see patient one more time today when wife arrives to practice steps with her assisting patient. Expect to clear patient following session with wife.   Progression toward goals:  [X]      Improving appropriately and progressing toward goals  [ ]      Improving slowly and progressing toward goals  [ ]      Not making progress toward goals and plan of care will be adjusted       PLAN:  Patient continues to benefit from skilled intervention to address the above impairments. Continue treatment per established plan of care. Discharge Recommendations:  Home Health  Further Equipment Recommendations for Discharge:  rolling walker       SUBJECTIVE:   Patient stated Once I get in my house, I am not coming out.    The patient stated 3/3 back precautions. Reviewed all 3 with patient. OBJECTIVE DATA SUMMARY:   Critical Behavior:  Neurologic State: Alert  Orientation Level: Oriented X4  Cognition: Follows commands  Safety/Judgement: Awareness of environment, Fall prevention, Home safety  Functional Mobility Training:  Bed Mobility:       Received already up on edge of bed. Brace already in place. Transfers:  Sit to Stand: Contact guard assistance  Stand to Sit: Contact guard assistance                             Balance:  Sitting: Intact  Standing: Intact; With support  Ambulation/Gait Training:  Distance (ft): 50 Feet (ft)  Assistive Device: Gait belt;Brace/Splint; Walker, rolling  Ambulation - Level of Assistance: Contact guard assistance        Gait Abnormalities: Step to gait        Base of Support: Widened                                        Stairs:  Number of Stairs Trained: 4  Stairs - Level of Assistance: Minimum assistance  Rail Use: Both  Therapeutic Exercises: Ankle pumps  Pain:  Pain Scale 1: Numeric (0 - 10)  Pain Intensity 1: 5              Activity Tolerance:   good  Please refer to the flowsheet for vital signs taken during this treatment.   After treatment:   [X]  Patient left in no apparent distress sitting up in chair  [ ]  Patient left in no apparent distress in bed  [X]  Call bell left within reach  [X]  Nursing notified  [ ]  Caregiver present  [ ]  Bed alarm activated      COMMUNICATION/COLLABORATION:   The patients plan of care was discussed with: Occupational Therapist and Registered Nurse     Suzanne Walker, PT   Time Calculation: 30 mins

## 2017-08-24 NOTE — PROGRESS NOTES
Pt's discharge Rx's were escribed to his pharmacy (Percocet, 5 mg Flexeril, Colace, Phenergan)  AVS updated  Pt will be d/c'd home today  Cleared by Hospitalist, Vascular Surgery and [address] [place] [address]  Orthopaedic Surgery  Physician Assistant to Dr. Kirt Esqueda

## 2017-08-24 NOTE — PROGRESS NOTES
Problem: Mobility Impaired (Adult and Pediatric)  Goal: *Acute Goals and Plan of Care (Insert Text)  Physical Therapy Goals  Initiated 8/22/2017  1. Patient will move from supine to sit and sit to supine , scoot up and down and roll side to side in bed with modified independence within 7 day(s). 2. Patient will transfer from bed to chair and chair to bed with supervision/set-up using the least restrictive device within 7 day(s). 3. Patient will perform sit <> stand with minimal assistance/contact guard assist within 7 day(s). 4. Patient will ambulate with minimal assistance/contact guard assist for 50 feet with the least restrictive device within 7 day(s). 5. Patient will ascend/descend 3 stairs with 2 handrail(s) with minimal assistance/contact guard assist within 7 day(s). PHYSICAL THERAPY TREATMENT  Patient: Rossana Youngblood (31 y.o. male)  Date: 8/24/2017  Precautions: Fall, Spinal      ASSESSMENT:  Patient received seated on edge of bed eating lunch but willing to work with PT. Wife and other family members present now. Patient stood and ambulated 30 feet X2 with rolling walker and CGA. Patient also went up and down 4 steps with CGA. Wife observing and expressing being comfortable with assisting patient at home. Patient has gait belt and rolling walker. He is safe for discharge with wife to assist as needed  and HHPT. Progression toward goals:  [X]      Improving appropriately and progressing toward goals  [ ]      Improving slowly and progressing toward goals  [ ]      Not making progress toward goals and plan of care will be adjusted       PLAN:  Patient continues to benefit from skilled intervention to address the above impairments. Continue treatment per established plan of care. Discharge Recommendations:  Home Health  Further Equipment Recommendations for Discharge:  none       SUBJECTIVE:   Patient stated That burger is nothing but grease. I can't eat that.    The patient stated 3/3 back precautions. Reviewed all 3 with patient. OBJECTIVE DATA SUMMARY:   Functional Mobility Training:  Bed Mobility:              Brace already in place  Transfers:  Sit to Stand: Contact guard assistance  Stand to Sit: Contact guard assistance                             Ambulation/Gait Training:  Distance (ft): 30 Feet (ft) (x2)  Assistive Device: Brace/Splint;Gait belt;Walker, rolling  Ambulation - Level of Assistance: Contact guard assistance        Gait Abnormalities: Step to gait        Base of Support: Widened                                        Stairs:  Number of Stairs Trained: 4  Stairs - Level of Assistance: Contact guard assistance  Rail Use: Both  Pain:  Pain Scale 1: Numeric (0 - 10)  Pain Intensity 1: 6 (Pt stated \"it is easing up. \")  Pain Location 1: Back  Pain Orientation 1: Lower  Pain Description 1: Aching  Pain Intervention(s) 1: Medication (see MAR)  Activity Tolerance:   good  Please refer to the flowsheet for vital signs taken during this treatment.   After treatment:   [ ]  Patient left in no apparent distress sitting up in chair  [X]  Patient left in no apparent distress sitting on edge of bed  [X]  Call bell left within reach  [X]  Nursing notified  [X]  Caregiver present  [ ]  Bed alarm activated      COMMUNICATION/COLLABORATION:   The patients plan of care was discussed with: Registered Nurse     Hazel Cassidy, PT   Time Calculation: 28 mins

## 2017-08-24 NOTE — DISCHARGE INSTRUCTIONS
Lumbar Spinal Surgery Discharge Instructions   Dr. Amie Calderón  [phone]    Activity:    Valerio Sandy You are going home a well person, be as active as possible. Your only exercise should be walking. Start with short frequent walks and increase your walking distance each day. Start with walking twice a day for 5 minutes and increase your distance each day 2-3 minutes until you reach 25 minutes twice a day. Limit the amount of time you sit to 20-30 minute intervals. Sitting for prolonged periods of time will be uncomfortable for you following your surgery.  No bending, lifting (of 5lbs or more), twisting, or straining.  Do not drive until your doctor states you may do so. However, you may ride in a car for short distances.  If you are required to wear a brace, you should wear it at all times when you are out of bed. You may remove it when sleeping unless your physician advises you against it.  When you are in the bed, you may lay on your back or on either side. Do not lie on your stomach.  You may resume sexual relations 6 weeks after surgery.  Continue to use your incentive spirometer for deep breathing exercises. Driving:   You may not drive or return to work until instructed by your physician. However, you may ride in the car for short periods of time. Brace:   If you have a back brace, you should wear your brace at all times when you are out of bed. Do not wear the brace while in bed or showering.  Remember to always wear a cotton t-shirt underneath your brace.  Do not bend or twist when your brace is off. Diet:   You may resume your regular home diet as tolerated. If your throat is sore, you should eat soft foods for the first couple of days.  Be sure to drink plenty of fluids; it is important to keep yourself hydrated.  Avoid alcoholic beverages and ABSOLUTELY NO tobacco products. Tobacco products will interfere with your healing.  If you continue to use tobacco, you may end up needing another surgery in the future. Dressing: You have on a Prineo dressing. This is a waterproof bacteriostatic dressing that requires no post-operative care. Please do not peel the dressing off, or apply any oil based products, as they may expedite the deterioration of the mesh. The dressing will slowly chip off on its own.  Do not rub or apply lotion or ointments to incision site. Shower:   You may shower 5 days after your surgery.  You may remove your brace during showers.  NO not use tub baths, swimming pools or Jacuzzis. Medications:   Check with your physician before taking any anti-inflammatory medications. (Advil, Aleve, Ibuprofen, Aspirin)   Take your pain medication as directed   Do NOT take additional Tylenol if your prescribed pain medication has acetaminophen in it (Endocet/Percocet, Lortab, Norco)   It is important to have regular bowel movements. Pain medications can be constipating. Stool softeners, warm prune juice, increasing your water intake, and increasing fiber in your diet can help in preventing constipation.  Do NOT take laxatives if at all possible except in severe situations. It can result in a vicious cycle of constipation and diarrhea. Stockings:   You have been given T.E.D. stockings to wear. Continue to wear these for 7 days after your discharge. Put them on in the morning and take them off at night. They are used to increase your circulation and prevent blood clots from forming in your legs. Follow-Up    Please call ASAP to schedule your 1st post-operative appointment. This should be approximately 3 weeks from your surgery date. Notify your physician in you develop any of the following conditions:   Fever above 101 degrees for 24 hours.  Nausea or vomiting.  Severe headache.    Inability to urinate   Loss of bowel or bladder function (sudden onset of incontinence)   Changes in sensation in your extremities (numbness, tingling, loss of color).  Severe pain or pain not relieved by medications.  Redness, swelling, or drainage from your incision.  Persistent pain in the chest.    Pain in the calf of either leg.  Increased weakness (if this is greater than before your surgery). If you have any questions about your dressing contact your Orthopaedic Surgeons office. YOU CAN RE-START TAKING YOU PLETAL, VITAMIN E AND FISH OIL WHEN YOU ARE 1 WEEK OUT FROM SURGERY       ** IMPORTANT: UNDER YOUR HONEYCOMB DRESSING, YOU HAVE A PRINEO ADHESIVE MESH DIRECTLY OVER YOUR INCISION. THIS WAS STERILELY GLUED TO YOUR SKIN DURING SURGERY. DO NOT REMOVE THIS. NO ONE ELSE SHOULD REMOVE IT EITHER. IT WILL COME OFF ON ITS OWN OVER TIME    * WEAR YOUR BRACE AS ADVISED    * NO DRIVING UNTIL YOU ARE CLEARED TO DO SO BY YOUR SURGEON    * LIMIT LIFTING, BENDING AND TWISTING.  NO LIFTING MORE THAN 5 LBS    * MAKE SURE YOU ARE GETTING GOOD NUTRITION (Lean Protein, Vitamin D AND Calcium)    * DO NOT TAKE ANY NSAIDs FOR THE FIRST 3 MONTHS AFTER SURGERY (such as Advil/Ibuprofen/Motrin, Aleve/Naproxen/Naprosyn, Diclofenac, Celebrex, Meloxicam, Indomethacin, Goody's powder, BC powder etc.)    * NO NICOTINE PRODUCTS    * FULLY READ YOUR DISCHARGE INSTRUCTIONS

## 2017-08-24 NOTE — PROGRESS NOTES
Problem: Self Care Deficits Care Plan (Adult)  Goal: *Acute Goals and Plan of Care (Insert Text)  Occupational Therapy Goals  Initiated 8/23/2017    1. Patient will perform grooming with supervision/set-up standing at sink within 7 days. 2. Patient will perform lower body dressing with modified independence using most appropriate AE within 7 days. 3. Patient will toileting at modified independence within 7 days. 4. Patient will perform toilet transfer with modified independence from RW to raised toilet seat within 7 days. 5. Patient will don/doff back brace at independence within 7 days. 6. Patient will verbalize/demonstrate 3/3 back precautions during ADL tasks without cues within 7 days. OCCUPATIONAL THERAPY TREATMENT  Patient: Sánchez Singh (48 y.o. male)  Date: 8/24/2017  Diagnosis: Bilateral stenosis of lateral recess of lumbar spine [M48.06] <principal problem not specified>  Procedure(s) (LRB):  L3-L5 LAMINECTOMY, FUSION, INSTRUMENTATION, TLIF, BONE GRAFT (N/A) 3 Days Post-Op  Precautions: Fall, Spinal No bending, no lifting greater than 5 lbs, no twisting, log-roll technique, repositioning every 20-30 min except when sleeping, LSO brace when OOB      ASSESSMENT:  Cleared by RN to see pt for therapy session. Pt received supine in bed, pleasant and agreeable to participate. Reviewed pt's back precautions, and pt able to recall 3/3 without cueing. Pt demonstrated understanding of logroll technique for bed mobility, performed supine>sit with supervision. Pt donned brace with supervision, demonstrated good understanding of technique and reports he practiced with brace prior to surgery. Educated pt on use of LB adaptive equipment for dressing and bathing, and pt sat EOB to perform dressing tasks. Donned shirt with independence and donned underwear and pants with supervision.  Pt required total A to don lace up shoes (difficulty using long handle shoe horn due to tightness of shoes), but reports that his wife has stated she will help him with dressing. Pt demonstrated improvement with functional transfers and standing balance today, stood with RW with CGA and ambulated to bathroom with no LOB. Performed toilet transfer with min A (only to guide over toilet) and stood at sink to perform grooming ADLs with RW without LOB. Discussed pressure relief, tub transfer techniques, and safe use of RW with in home with pt, and pt verbalized understanding. Pt had no further questions or concerns for going home at end of session, and has a good understanding of his precautions, use of back brace and home safety techniques. He is cleared by OT to return home with supervision and assistance of wife. If pt is not discharged today, recommend follow up session tomorrow to review dressing and functional transfers techniques. Recommend HHOT upon discharge. Progression toward goals:  [X]          Improving appropriately and progressing toward goals  [ ]          Improving slowly and progressing toward goals  [ ]          Not making progress toward goals and plan of care will be adjusted       PLAN:  Patient continues to benefit from skilled intervention to address the above impairments. Continue treatment per established plan of care. Discharge Recommendations:  Home Health  Further Equipment Recommendations for Discharge:  LB adaptive equipment (pt already has reacher)       SUBJECTIVE:   Patient stated The biggest thing I'm worried about is the tub. But I can have my son help me with that.   The patient stated 3/3 back precautions. Reviewed all 3 with patient.       OBJECTIVE DATA SUMMARY:   Cognitive/Behavioral Status:  Neurologic State: Alert  Orientation Level: Oriented X4  Cognition: Follows commands, Appropriate safety awareness  Safety/Judgement: Good awareness of safety precautions, Fall prevention, Home safety     Functional Mobility and Transfers for ADLs:  Bed Mobility:  Rolling: Supervision  Supine to Sit: Supervision Transfers:  Sit to Stand: Contact guard assistance  Functional Transfers  Toilet Transfer : Assist x1;Minimum assistance     Balance:  Sitting: Intact  Standing: Intact; With support  Standing - Static: Good  Standing - Dynamic : Fair     ADL Intervention and Instruction:     Pt donned brace with supervision, demonstrated good understanding of technique and reports he practiced with brace prior to surgery. Educated pt on use of LB adaptive equipment for dressing and bathing, and pt sat EOB to perform dressing tasks. Donned shirt with independence and donned underwear and pants with supervision using reacher (pt reports he used this technique with previous knee surgery). Pt required total A to don lace up shoes (difficulty using long handle shoe horn due to tightness of shoes), but reports that his wife has stated she will help him with dressing. Discussed pressure relief, tub transfer techniques, and safe use of RW with in home with pt, and pt verbalized understanding. Grooming  Grooming Assistance: Supervision/set up (standing at sink with RW)  Washing Hands: Supervision/set-up           Upper Body Dressing Assistance  Orthotics(Brace): Modified independent  Front Opened Shirt: Independent     Lower Body Dressing Assistance  Underpants: Supervision/set-up  Pants With Button/Zipper: Supervision/set-up  Shoes with Cloth Laces: Total assistance (dependent)           Cognitive Retraining  Safety/Judgement: Good awareness of safety precautions; Fall prevention;Home safety  Bathing: Patient instructed and indicated understanding when bathing to not submerge wound in water, stand to shower or sponge bathe, cover wound with plastic and tape to ensure no water reaches bandage/wound without cues. Dressing brace: Patient instructed and demonstrated to don/doff velcro on brace using dominant side, keeping non-dominant side intact.  Patient instructed and demonstrated in meantime of being able to stand with back against wall to don/doff brace, to don/doff seated using lap and bed/chair surface to support brace while manipulating. Dressing lower body: Patient instructed to don brace first and on the benefits to remain seated to don all clothing to increase independence with precautions and pain management. Toileting: Patient instructed on the benefits of using flushable wet wipes and toilet tongs if decreased reach or pain for darling care. Also, the benefits of a reacher to aid in clothing management. Home safety: Patient instructed and indicated understanding on home modifications and safety (raise height of ADL objects, appropriate height of chair surfaces, recliner safety, change of floor surfaces, clear pathways) to increase independence and fall prevention. Standing: Patient instructed and indicated understanding to walk up to sink/counter top/surfaces, step into walker, square off while using objects, slide objects along surfaces, to increase adherence to back precautions and fall prevention. Patient instructed to increase amount of time standing in order to increase independence and tolerance with ADLs. During prolonged standing, can open cabinet door or place foot on stool to decrease spinal pressure/increase pain. Tub transfer: Patient instructed and indicated understanding regarding when it is safe to begin transfer into tub (complete stairs with PT, advance exercises with PT high enough to clear tub height, and while clothes donned practice with another person present). Patient instructed and indicated understanding to use the same technique as used with stairs when entering and exiting tub (\"up with good leg, down with bad leg\"). Patient instructed and indicated understanding the benefits of maintaining activity tolerance, functional mobility, and independence with self care tasks during acute stay  to ensure safe return home and to baseline.  Encouraged patient to increase frequency and duration OOB, not sitting longer than 30 mins without marching/walking with staff, be out of bed for all meals, perform daily ADLs (as approved by RN/MD regarding bathing etc), and performing functional mobility to/from bathroom. Patient instruction and indicated understanding on body mechanics, ergonomics and gravitational force on the spine during different body positions to plan activities in prep for return home to complete instrumental ADLs and back to work safely. Pain:  Pain Scale 1: Numeric (0 - 10)  Pain Intensity 1: 5      Activity Tolerance:   Good  Please refer to the flowsheet for vital signs taken during this treatment.   After treatment:   [X] Patient left in no apparent distress sitting up in chair  [ ] Patient left in no apparent distress in bed  [X] Call bell left within reach  [X] Nursing notified  [ ] Caregiver present  [ ] Bed alarm activated      COMMUNICATION/COLLABORATION:   The patients plan of care was discussed with: Physical Therapist and Registered Nurse     DEV Benson  Time Calculation: 38 mins

## 2017-08-24 NOTE — PROGRESS NOTES
[facility] [place] [facility]  [address]  [phone]      Medical Progress Note      NAME: Nagi Ambrosio   :  1940  MRM:  [id number]    Date/Time: 2017         Subjective:     Chief Complaint:  Patient was seen and examined by me. Chart reviewed. Doing well, getting ready to go home       Objective:       Vitals:       Last 24hrs VS reviewed since prior progress note.  Most recent are:    Visit Vitals    BP (!) 180/94 (BP 1 Location: Right arm, BP Patient Position: At rest)    Pulse 93    Temp 98.6 °F (37 °C)    Resp 16    Ht 6' (1.829 m)    Wt 84.5 kg (186 lb 4.6 oz)    SpO2 96%    BMI 25.27 kg/m2     SpO2 Readings from Last 6 Encounters:   17 96%   17 96%    O2 Flow Rate (L/min): 3 l/min       Intake/Output Summary (Last 24 hours) at 17 1029  Last data filed at 17 0258   Gross per 24 hour   Intake                0 ml   Output              400 ml   Net             -400 ml        Exam:     Physical Exam:    Gen:  Well-developed, well-nourished, obese, in no acute distress  HEENT:  Pink conjunctivae, PERRL, hearing intact to voice, moist mucous membranes  Neck:  Supple, without masses, thyroid non-tender  Resp:  No accessory muscle use, clear breath sounds without wheezes rales or rhonchi  Card:  No murmurs, normal S1, S2 without thrills, bruits or peripheral edema  Abd:  Soft, non-tender, non-distended, normoactive bowel sounds are present  Musc:  No cyanosis or clubbing, poor distal pulses  Skin:  No rashes   Neuro:  Cranial nerves 3-12 are grossly intact, follows commands appropriately  Psych:  Good insight, oriented to person, place and time, alert    Medications Reviewed: (see below)    Lab Data Reviewed: (see below)    ______________________________________________________________________    Medications:     Current Facility-Administered Medications   Medication Dose Route Frequency    lactase (LACTAID) tablet 6,000 Units  6,000 Units Oral QID PRN    HYDROmorphone (PF) (DILAUDID) injection 1 mg  1 mg IntraVENous Q4H PRN    oxyCODONE-acetaminophen (PERCOCET 10)  mg per tablet 1 Tab  1 Tab Oral Q4H PRN    oxyCODONE-acetaminophen (PERCOCET) 5-325 mg per tablet 1 Tab  1 Tab Oral Q4H PRN    benazepril (LOTENSIN) tablet 40 mg  40 mg Oral DAILY    bisoprolol-hydroCHLOROthiazide (ZIAC) 10-6.25 mg per tablet 1 Tab  1 Tab Oral DAILY    famotidine (PEPCID) tablet 20 mg  20 mg Oral BID    0.9% sodium chloride infusion  50 mL/hr IntraVENous CONTINUOUS    sodium chloride (NS) flush 5-10 mL  5-10 mL IntraVENous Q8H    sodium chloride (NS) flush 5-10 mL  5-10 mL IntraVENous PRN    diazePAM (VALIUM) tablet 5 mg  5 mg Oral Q6H PRN    acetaminophen (TYLENOL) tablet 650 mg  650 mg Oral Q4H PRN    naloxone (NARCAN) injection 0.4 mg  0.4 mg IntraVENous PRN    ondansetron (ZOFRAN) injection 4 mg  4 mg IntraVENous Q4H PRN    diphenhydrAMINE (BENADRYL) capsule 25 mg  25 mg Oral Q4H PRN    docusate sodium (COLACE) capsule 100 mg  100 mg Oral BID    cholecalciferol (VITAMIN D3) tablet 1,000 Units  1,000 Units Oral DAILY    hydrALAZINE (APRESOLINE) 20 mg/mL injection 10 mg  10 mg IntraVENous Q2H PRN          Lab Review:     Recent Labs      08/24/17   0134  08/23/17   0233  08/22/17   0121   WBC  7.9  9.5  11.1   HGB  11.5*  10.9*  13.7   HCT  34.3*  33.7*  42.7   PLT  178  153  191     Recent Labs      08/24/17   0134  08/23/17   0233  08/22/17   2135  08/22/17   0121   NA  144  142   --   140   K  4.6  4.4  5.3*  5.3*   CL  108  109*   --   105   CO2  32  31   --   29   GLU  114*  122*   --   151*   BUN  20  25*   --   28*   CREA  1.44*  1.55*   --   1.90*   CA  8.1*  7.7*   --   8.6   MG  1.8  1.9   --    --    PHOS  2.9  2.6   --    --      No results found for: GLUCPOC       Assessment / Plan: Active Problems:    77 yo hx of HTN, prostate CA, lumbar stenosis s/p lumbar surg.   Medicine is following as a consultant    1) Lumbar stenosis with neurogenic claudication: s/p surg. Management of DVT prophy, rehab, pain control, per Ortho    2) Acute distal DVT (deep venous thrombosis) of L calf: unable to start anticoagulation due to recent lumbar surg. Low risk for embolization. Would repeat LE dopplers in 1 month to ensure resolution. F/u with patient's vascular surgeon    3) PAD/intermittent BLE rest pain: SAURABH of right is 0.32 and left is 0.24. Defer to Ortho as to when he can resume his Pletal.  Patient will f/u with his vascular surgeon, Dr. Chaka Mcnally     4) HTN: cont benazepril. Use hydralazine prn    5) CKD: will monitor BMP    **Patient is medically stable. Will sign off.   Thank you for consult**    Total time spent with patient: 20 Minutes                  Care Plan discussed with: Patient, nursing    Discussed:  Care Plan    Prophylaxis:  per team    Disposition:  per team           ___________________________________________________    Attending Physician: Dima Moore MD

## 2017-08-24 NOTE — PROGRESS NOTES
1455: Paged vascular surgery per request of Ortho for d/c clearance. A note is not dictated from MD Keith Altamn. 1500: MD Keith Altman stated pt OK to d/c from his standpoint. Pt to f/u w/ his vasc. surgeon outpt in 6w. Notified Yelena Swartz NP.  4588: Yelena Swartz NP contacting MD Adele Ferguson directly regarding pt's d/c plan. 1718: Yelena Swartz NP stated she spoke w/ MD Adele Ferguson. OK for pt to be d/c, to start pletal 7d post-op & to d/c drain. 1747: I have reviewed discharge instructions with the patient and spouse. The patient and spouse verbalized understanding. Pt d/c medications are being changed by Rosas GARCÍA. D/c RN re-printing AVS & will review w/ pt. RX being E-scribed.

## 2017-08-24 NOTE — PROGRESS NOTES
8/24/2017 4:04 PM At [address] has been sent discharge clinicals and notified of discharge home today via [employer].  LAURIE Hardin

## 2017-08-24 NOTE — PROGRESS NOTES
Problem: Falls - Risk of  Goal: *Absence of Falls  Document Nicola Fall Risk and appropriate interventions in the flowsheet.    Outcome: Progressing Towards Goal  Fall Risk Interventions:  Mobility Interventions: Bed/chair exit alarm           Medication Interventions: Patient to call before getting OOB

## 2017-08-24 NOTE — DISCHARGE SUMMARY
DISCHARGE SUMMARY     Patient: Jessica Rust                             Medical Record Number: [id number]                : 1940  Age: 78 y.o. Admit Date: 2017  Discharge Date:   Admission Diagnosis: Bilateral stenosis of lateral recess of lumbar spine [M48.06]  Discharge Diagnosis: Bilateral stenosis of lateral recess of lumbar spine [M48.06]  Procedures: Procedure(s):  L3-L5 LAMINECTOMY, FUSION, INSTRUMENTATION, TLIF, BONE GRAFT  Surgeon: Taylor Sewell MD  Co-surgeon:   Assistants:   Anesthesia: General  Complications: None     History of Present Illness:  Jessica Rust is a 78 y.o. male with a history of intractible low back and radiculopathy. Despite conservative management and after clinical and radiographic evaluation, it was determined that he suffered from lumbar stenosis  and lumbar spondylolisthesis and would benefit from Procedure(s):  L3-L5 LAMINECTOMY, FUSION, INSTRUMENTATION, TLIF, BONE GRAFT, which he consented to undergo after a discussion of the risks, benefits, alternatives, rehab concerns, and potential complications of surgery. Hospital Course:  Jessica Rust tolerated the procedure well. He was transferred  to the recovery room in stable condition. In the PACU, pt complained of having severe L calf pain. A L LE venous doppler US was ordered. The study was + for an acute posterior tibial DVT. The Hospitalist was subsequently consulted. After a brief stay, the patient was then transferred to the Orthopedic floor. On postoperative day #1, the dressing was clean and dry and he was neurovascularly intact. The patient was afebrile and vital signs were stable. Calves were soft. Patient had some L calf tenderness but no R calf tenderness. In consultation with the Hospitalist, it was determined that anti-coagulation was not recommended. Vascular surgery was also consulted during his admission due to decreased peripheral pulses in bilateral LEs.  Pt was noted to have significant PVD in bilateral LEs. He will f/u with his established Vascular Surgeon regarding this. Corine Al made excellent progress with physical therapy and was discharged to Home w/ Home Health in stable condition on postoperative day 3. He was provided with routine postoperative instructions and advised to follow up in my office in 3 weeks following discharge from the hospital.  He was given prescriptions for medication to control post-operative symptoms. He will also follow up with his Primary Care Physician and Vascular Surgeon as discussed. He understands that he will need a f/u L LE venous doppler US. Discharge Medications:  Current Discharge Medication List      START taking these medications    Details   promethazine (PHENERGAN) 25 mg tablet Take 1 Tab by mouth every six (6) hours as needed for Nausea. Qty: 1 Tab, Refills: 0      docusate sodium (COLACE) 100 mg capsule Take 1 Cap by mouth two (2) times a day for 90 days. Qty: 60 Cap, Refills: 2      oxyCODONE-acetaminophen (PERCOCET) 5-325 mg per tablet Take 1-2 Tabs by mouth every four (4) hours as needed for Pain. Max Daily Amount: 12 Tabs. Qty: 1 Tab, Refills: 0      cyclobenzaprine (FLEXERIL) 5 mg tablet Take 1 Tab by mouth three (3) times daily as needed for Muscle Spasm(s). Qty: 60 Tab, Refills: 1         CONTINUE these medications which have NOT CHANGED    Details   benazepril (LOTENSIN) 40 mg tablet Take 40 mg by mouth daily. bisoprolol-hydroCHLOROthiazide (ZIAC) 10-6.25 mg per tablet Take 1 Tab by mouth daily. !! OTHER Take 2 Caps by mouth as needed. Lactase dairy digestive       famotidine (PEPCID) 20 mg tablet Take 20 mg by mouth two (2) times a day. Takes with breakfast & dinner      ascorbic acid, vitamin C, (VITAMIN C) 500 mg tablet Take 500 mg by mouth daily. FOLIC ACID/MULTIVIT-MIN/LUTEIN (CENTRUM SILVER PO) Take 1 Tab by mouth daily. !! OTHER Take 1 Cap by mouth daily.  Vitamin D 3 1000 mg       !! - Potential duplicate medications found. Please discuss with provider.       STOP taking these medications       cilostazol (PLETAL) 100 mg tablet Comments:   Reason for Stopping:         vitamin E (AQUA GEMS) 400 unit capsule Comments:   Reason for Stopping:         naproxen sodium (ALEVE) 220 mg cap Comments:   Reason for Stopping:               RAQUEL Ratliff  8/24/2017  Orthopaedic Surgery  Physician Assistant to Dr. Regis Faulkner

## 2017-08-25 NOTE — PROGRESS NOTES
Spiritual Care Partner Volunteer visited patient in 28 on 8.24.17.   Documented by:    Kian Baxter M.Div, M.S, [place] 62 available at 3[facility]([id number]
